# Patient Record
Sex: FEMALE | Race: WHITE | NOT HISPANIC OR LATINO | Employment: FULL TIME | URBAN - METROPOLITAN AREA
[De-identification: names, ages, dates, MRNs, and addresses within clinical notes are randomized per-mention and may not be internally consistent; named-entity substitution may affect disease eponyms.]

---

## 2023-02-21 LAB
CAROTENE: 121 UG/DL (ref 60–200)
CAROTENE: 131 UG/DL (ref 60–200)
VITAMIN B6: 76.7 NMOL/L (ref 20–125)
VITAMIN E (ALPHA-TOCOPHEROL): 9.3 MG/L (ref 5.5–18)
VITAMIN E (GAMMA-TOCOPHEROL): 1.5 MG/L (ref 0–6)

## 2023-02-22 LAB — VITAMIN B1, WHOLE BLOOD: 105 NMOL/L (ref 70–180)

## 2023-02-27 LAB — VITAMIN A (RETINOL): 60 UG/DL (ref 20.1–62)

## 2023-04-27 LAB
FERRITIN (UG/LL) IN SER/PLAS: 64 UG/L (ref 8–150)
IRON (UG/DL) IN SER/PLAS: 65 UG/DL (ref 35–150)
IRON BINDING CAPACITY (UG/DL) IN SER/PLAS: 320 UG/DL (ref 240–445)
IRON SATURATION (%) IN SER/PLAS: 20 % (ref 25–45)
THYROTROPIN (MIU/L) IN SER/PLAS BY DETECTION LIMIT <= 0.05 MIU/L: 1.5 MIU/L (ref 0.44–3.98)

## 2023-04-28 LAB
DEAMIDATED GLIADIN PEPTIDE IGA: 37 U/ML (ref 0–14)
DEAMIDATED GLIADIN PEPTIDE IGG: 84 U/ML (ref 0–14)
TISSUE TRANSGLUTAMINASE IGG: 19 U/ML (ref 0–14)
TISSUE TRANSGLUTAMINASE, IGA: 10 U/ML (ref 0–14)

## 2023-09-15 LAB
DEAMIDATED GLIADIN PEPTIDE IGA: 29 U/ML (ref 0–14)
DEAMIDATED GLIADIN PEPTIDE IGG: 79 U/ML (ref 0–14)
TISSUE TRANSGLUTAMINASE IGG: 10 U/ML (ref 0–14)
TISSUE TRANSGLUTAMINASE, IGA: 6 U/ML (ref 0–14)

## 2023-09-19 LAB
ALANINE AMINOTRANSFERASE (SGPT) (U/L) IN SER/PLAS: 19 U/L (ref 7–45)
ALBUMIN (G/DL) IN SER/PLAS: 4.1 G/DL (ref 3.4–5)
ALKALINE PHOSPHATASE (U/L) IN SER/PLAS: 55 U/L (ref 33–110)
ANION GAP IN SER/PLAS: 10 MMOL/L (ref 10–20)
APPEARANCE, URINE: CLEAR
ASPARTATE AMINOTRANSFERASE (SGOT) (U/L) IN SER/PLAS: 15 U/L (ref 9–39)
BASOPHILS (10*3/UL) IN BLOOD BY AUTOMATED COUNT: 0.04 X10E9/L (ref 0–0.1)
BASOPHILS/100 LEUKOCYTES IN BLOOD BY AUTOMATED COUNT: 0.7 % (ref 0–2)
BILIRUBIN TOTAL (MG/DL) IN SER/PLAS: 0.4 MG/DL (ref 0–1.2)
BILIRUBIN, URINE: NEGATIVE
BLOOD, URINE: NEGATIVE
CALCIUM (MG/DL) IN SER/PLAS: 10 MG/DL (ref 8.6–10.3)
CARBON DIOXIDE, TOTAL (MMOL/L) IN SER/PLAS: 28 MMOL/L (ref 21–32)
CHLORIDE (MMOL/L) IN SER/PLAS: 103 MMOL/L (ref 98–107)
CITRULLINE ANTIBODY, IGG: <1 U/ML
COLOR, URINE: NORMAL
COMPLEMENT C3 (MG/DL) IN SER/PLAS: 101 MG/DL (ref 87–200)
COMPLEMENT C4 (MG/DL) IN SER/PLAS: 18 MG/DL (ref 10–50)
CREATININE (MG/DL) IN SER/PLAS: 0.72 MG/DL (ref 0.5–1.05)
EOSINOPHILS (10*3/UL) IN BLOOD BY AUTOMATED COUNT: 0.07 X10E9/L (ref 0–0.7)
EOSINOPHILS/100 LEUKOCYTES IN BLOOD BY AUTOMATED COUNT: 1.2 % (ref 0–6)
ERYTHROCYTE DISTRIBUTION WIDTH (RATIO) BY AUTOMATED COUNT: 12.8 % (ref 11.5–14.5)
ERYTHROCYTE MEAN CORPUSCULAR HEMOGLOBIN CONCENTRATION (G/DL) BY AUTOMATED: 32.2 G/DL (ref 32–36)
ERYTHROCYTE MEAN CORPUSCULAR VOLUME (FL) BY AUTOMATED COUNT: 91 FL (ref 80–100)
ERYTHROCYTES (10*6/UL) IN BLOOD BY AUTOMATED COUNT: 4.7 X10E12/L (ref 4–5.2)
GFR FEMALE: >90 ML/MIN/1.73M2
GLUCOSE (MG/DL) IN SER/PLAS: 91 MG/DL (ref 74–99)
GLUCOSE, URINE: NEGATIVE MG/DL
HEMATOCRIT (%) IN BLOOD BY AUTOMATED COUNT: 42.9 % (ref 36–46)
HEMOGLOBIN (G/DL) IN BLOOD: 13.8 G/DL (ref 12–16)
IMMATURE GRANULOCYTES/100 LEUKOCYTES IN BLOOD BY AUTOMATED COUNT: 0.3 % (ref 0–0.9)
KETONES, URINE: NEGATIVE MG/DL
LEUKOCYTE ESTERASE, URINE: NEGATIVE
LEUKOCYTES (10*3/UL) IN BLOOD BY AUTOMATED COUNT: 6.1 X10E9/L (ref 4.4–11.3)
LYMPHOCYTES (10*3/UL) IN BLOOD BY AUTOMATED COUNT: 1.78 X10E9/L (ref 1.2–4.8)
LYMPHOCYTES/100 LEUKOCYTES IN BLOOD BY AUTOMATED COUNT: 29.3 % (ref 13–44)
MONOCYTES (10*3/UL) IN BLOOD BY AUTOMATED COUNT: 0.47 X10E9/L (ref 0.1–1)
MONOCYTES/100 LEUKOCYTES IN BLOOD BY AUTOMATED COUNT: 7.7 % (ref 2–10)
NEUTROPHILS (10*3/UL) IN BLOOD BY AUTOMATED COUNT: 3.7 X10E9/L (ref 1.2–7.7)
NEUTROPHILS/100 LEUKOCYTES IN BLOOD BY AUTOMATED COUNT: 60.8 % (ref 40–80)
NITRITE, URINE: NEGATIVE
PH, URINE: 7 (ref 5–8)
PLATELETS (10*3/UL) IN BLOOD AUTOMATED COUNT: 258 X10E9/L (ref 150–450)
POTASSIUM (MMOL/L) IN SER/PLAS: 3.8 MMOL/L (ref 3.5–5.3)
PROTEIN TOTAL: 7 G/DL (ref 6.4–8.2)
PROTEIN, URINE: NEGATIVE MG/DL
RHEUMATOID FACTOR (IU/ML) IN SERUM OR PLASMA: <10 IU/ML (ref 0–15)
SODIUM (MMOL/L) IN SER/PLAS: 137 MMOL/L (ref 136–145)
SPECIFIC GRAVITY, URINE: 1.01 (ref 1–1.03)
THYROTROPIN (MIU/L) IN SER/PLAS BY DETECTION LIMIT <= 0.05 MIU/L: 2.47 MIU/L (ref 0.44–3.98)
UREA NITROGEN (MG/DL) IN SER/PLAS: 16 MG/DL (ref 6–23)
UROBILINOGEN, URINE: <2 MG/DL (ref 0–1.9)

## 2023-09-20 LAB
ANA PATTERN: ABNORMAL
ANA TITER: ABNORMAL
ANTI-CENTROMERE: <0.2 AI
ANTI-CHROMATIN: 0.2 AI
ANTI-DNA (DS): 21 IU/ML
ANTI-JO-1 IGG: <0.2 AI
ANTI-NUCLEAR ANTIBODY (ANA): POSITIVE
ANTI-RIBOSOMAL P: <0.2 AI
ANTI-RNP: <0.2 AI
ANTI-SCL-70: <0.2 AI
ANTI-SM/RNP: <0.2 AI
ANTI-SM: <0.2 AI
ANTI-SSA: <0.2 AI
ANTI-SSB: <0.2 AI

## 2023-10-20 ENCOUNTER — PHARMACY VISIT (OUTPATIENT)
Dept: PHARMACY | Facility: CLINIC | Age: 48
End: 2023-10-20
Payer: COMMERCIAL

## 2023-10-20 PROCEDURE — RXMED WILLOW AMBULATORY MEDICATION CHARGE

## 2023-10-31 ENCOUNTER — PHARMACY VISIT (OUTPATIENT)
Dept: PHARMACY | Facility: CLINIC | Age: 48
End: 2023-10-31
Payer: COMMERCIAL

## 2023-11-01 ENCOUNTER — OFFICE VISIT (OUTPATIENT)
Dept: RHEUMATOLOGY | Facility: CLINIC | Age: 48
End: 2023-11-01
Payer: COMMERCIAL

## 2023-11-01 VITALS — SYSTOLIC BLOOD PRESSURE: 118 MMHG | BODY MASS INDEX: 25.24 KG/M2 | DIASTOLIC BLOOD PRESSURE: 70 MMHG | WEIGHT: 154 LBS

## 2023-11-01 DIAGNOSIS — M35.9 UNDIFFERENTIATED CONNECTIVE TISSUE DISEASE (MULTI): ICD-10-CM

## 2023-11-01 DIAGNOSIS — R76.8 POSITIVE ANA (ANTINUCLEAR ANTIBODY): Primary | ICD-10-CM

## 2023-11-01 DIAGNOSIS — M19.90 ARTHRITIS: ICD-10-CM

## 2023-11-01 PROBLEM — L81.4 OTHER MELANIN HYPERPIGMENTATION: Status: ACTIVE | Noted: 2021-12-02

## 2023-11-01 PROBLEM — D18.01 HEMANGIOMA OF SKIN AND SUBCUTANEOUS TISSUE: Status: ACTIVE | Noted: 2021-12-02

## 2023-11-01 PROBLEM — F41.9 ANXIETY: Status: ACTIVE | Noted: 2023-11-01

## 2023-11-01 PROBLEM — K90.0 CELIAC DISEASE (HHS-HCC): Status: ACTIVE | Noted: 2022-12-03

## 2023-11-01 PROBLEM — J30.9 ALLERGIC RHINITIS: Status: ACTIVE | Noted: 2023-11-01

## 2023-11-01 PROBLEM — D22.4 MELANOCYTIC NEVI OF SCALP AND NECK: Status: ACTIVE | Noted: 2021-12-02

## 2023-11-01 PROBLEM — F41.8 DEPRESSION WITH ANXIETY: Status: ACTIVE | Noted: 2022-12-03

## 2023-11-01 PROBLEM — R53.81 MALAISE: Status: ACTIVE | Noted: 2023-11-01

## 2023-11-01 PROBLEM — R87.612 LOW GRADE SQUAMOUS INTRAEPITH LESION ON CYTOLOGIC SMEAR CERVIX (LGSIL): Status: ACTIVE | Noted: 2023-11-01

## 2023-11-01 PROBLEM — C44.612 BASAL CELL CARCINOMA OF SKIN OF RIGHT UPPER LIMB, INCLUDING SHOULDER: Status: ACTIVE | Noted: 2021-12-01

## 2023-11-01 PROBLEM — R19.8 IRREGULAR BOWEL HABITS: Status: ACTIVE | Noted: 2023-11-01

## 2023-11-01 PROBLEM — R19.7 DIARRHEA: Status: ACTIVE | Noted: 2022-12-16

## 2023-11-01 PROBLEM — Z79.899 OTHER LONG TERM (CURRENT) DRUG THERAPY: Status: ACTIVE | Noted: 2022-12-03

## 2023-11-01 PROBLEM — R41.3 MEMORY LOSS: Status: ACTIVE | Noted: 2023-11-01

## 2023-11-01 PROBLEM — L91.8 OTHER HYPERTROPHIC DISORDERS OF THE SKIN: Status: ACTIVE | Noted: 2021-12-02

## 2023-11-01 PROBLEM — L70.9 ACNE: Status: ACTIVE | Noted: 2023-05-08

## 2023-11-01 PROBLEM — L81.9 ATYPICAL PIGMENTED SKIN LESION: Status: ACTIVE | Noted: 2023-11-01

## 2023-11-01 PROBLEM — R14.0 ABDOMINAL BLOATING: Status: ACTIVE | Noted: 2023-11-01

## 2023-11-01 PROBLEM — L30.9 DERMATITIS: Status: ACTIVE | Noted: 2023-11-01

## 2023-11-01 PROBLEM — R13.19 ESOPHAGEAL DYSPHAGIA: Status: ACTIVE | Noted: 2023-11-01

## 2023-11-01 PROBLEM — D22.70 MELANOCYTIC NEVI OF UNSPECIFIED LOWER LIMB, INCLUDING HIP: Status: ACTIVE | Noted: 2021-12-02

## 2023-11-01 PROBLEM — R19.8 OTHER SPECIFIED SYMPTOMS AND SIGNS INVOLVING THE DIGESTIVE SYSTEM AND ABDOMEN: Status: ACTIVE | Noted: 2023-06-30

## 2023-11-01 PROBLEM — K59.04 CHRONIC IDIOPATHIC CONSTIPATION: Status: ACTIVE | Noted: 2023-11-01

## 2023-11-01 PROBLEM — Z86.2 HISTORY OF ANEMIA: Status: ACTIVE | Noted: 2023-11-01

## 2023-11-01 PROBLEM — R23.2 HOT FLASHES: Status: ACTIVE | Noted: 2023-11-01

## 2023-11-01 PROBLEM — Z86.59 HISTORY OF DEPRESSION: Status: ACTIVE | Noted: 2023-11-01

## 2023-11-01 PROBLEM — R14.0 ABDOMINAL DISTENSION (GASEOUS): Status: ACTIVE | Noted: 2023-07-01

## 2023-11-01 PROBLEM — R19.4 CHANGE IN BOWEL HABIT: Status: ACTIVE | Noted: 2023-07-01

## 2023-11-01 PROBLEM — D22.5 MELANOCYTIC NEVI OF TRUNK: Status: ACTIVE | Noted: 2021-12-01

## 2023-11-01 PROBLEM — R11.0 MILD NAUSEA: Status: ACTIVE | Noted: 2023-11-01

## 2023-11-01 PROBLEM — G47.00 INSOMNIA: Status: ACTIVE | Noted: 2022-10-12

## 2023-11-01 PROBLEM — M25.50 ARTHRALGIA: Status: ACTIVE | Noted: 2023-11-01

## 2023-11-01 PROBLEM — R21 RASH: Status: ACTIVE | Noted: 2023-11-01

## 2023-11-01 PROBLEM — J30.1 ALLERGIC RHINITIS DUE TO POLLEN: Status: ACTIVE | Noted: 2023-11-01

## 2023-11-01 PROBLEM — F39 MOOD DISORDER (CMS-HCC): Status: ACTIVE | Noted: 2022-12-03

## 2023-11-01 PROBLEM — K59.09 CHRONIC CONSTIPATION: Status: ACTIVE | Noted: 2023-11-01

## 2023-11-01 PROBLEM — L70.0 ACNE VULGARIS: Status: ACTIVE | Noted: 2021-12-01

## 2023-11-01 PROBLEM — Z86.19 HISTORY OF VARICELLA: Status: ACTIVE | Noted: 2023-11-01

## 2023-11-01 PROBLEM — N63.10 BREAST MASS, RIGHT: Status: ACTIVE | Noted: 2023-11-01

## 2023-11-01 PROBLEM — R53.83 FATIGUE: Status: ACTIVE | Noted: 2022-10-12

## 2023-11-01 PROBLEM — B37.31 VAGINAL CANDIDIASIS: Status: ACTIVE | Noted: 2023-11-01

## 2023-11-01 PROCEDURE — 99214 OFFICE O/P EST MOD 30 MIN: CPT | Performed by: INTERNAL MEDICINE

## 2023-11-01 RX ORDER — NABUMETONE 750 MG/1
750 TABLET, FILM COATED ORAL 2 TIMES DAILY
Qty: 60 TABLET | Refills: 3 | Status: SHIPPED | OUTPATIENT
Start: 2023-11-01 | End: 2024-10-31

## 2023-11-01 RX ORDER — MULTIVIT,IRON,MINERALS/LUTEIN
TABLET ORAL
COMMUNITY
Start: 2022-10-06 | End: 2024-02-07 | Stop reason: ALTCHOICE

## 2023-11-01 RX ORDER — DAPSONE 50 MG/G
GEL TOPICAL
COMMUNITY
Start: 2021-12-02 | End: 2023-11-01 | Stop reason: ALTCHOICE

## 2023-11-01 RX ORDER — OXYCODONE AND ACETAMINOPHEN 5; 325 MG/1; MG/1
TABLET ORAL
COMMUNITY
Start: 2017-12-28 | End: 2023-11-01 | Stop reason: ALTCHOICE

## 2023-11-01 RX ORDER — LANOLIN ALCOHOL/MO/W.PET/CERES
CREAM (GRAM) TOPICAL
COMMUNITY
Start: 2023-09-19

## 2023-11-01 RX ORDER — BUPROPION HYDROCHLORIDE 150 MG/1
TABLET ORAL
COMMUNITY
End: 2023-11-01 | Stop reason: ALTCHOICE

## 2023-11-01 RX ORDER — BENZOYL PEROXIDE 100 MG/ML
LIQUID TOPICAL
COMMUNITY
Start: 2021-10-29 | End: 2023-11-01 | Stop reason: ALTCHOICE

## 2023-11-01 RX ORDER — DROSPIRENONE AND ETHINYL ESTRADIOL 0.03MG-3MG
KIT ORAL
COMMUNITY
Start: 2011-02-16 | End: 2023-11-01 | Stop reason: ALTCHOICE

## 2023-11-01 RX ORDER — CLOBETASOL PROPIONATE 0.5 MG/G
CREAM TOPICAL
COMMUNITY
Start: 2022-09-19 | End: 2023-11-01 | Stop reason: ALTCHOICE

## 2023-11-01 RX ORDER — TRIAMCINOLONE ACETONIDE 1 MG/G
OINTMENT TOPICAL
COMMUNITY
Start: 2021-09-20 | End: 2023-11-01 | Stop reason: ALTCHOICE

## 2023-11-01 RX ORDER — ISOTRETINOIN 40 MG/1
CAPSULE ORAL
COMMUNITY
Start: 2022-09-19 | End: 2023-11-01 | Stop reason: ALTCHOICE

## 2023-11-01 RX ORDER — CITALOPRAM 10 MG/1
TABLET ORAL
COMMUNITY
Start: 2022-12-15 | End: 2023-11-01 | Stop reason: ALTCHOICE

## 2023-11-01 RX ORDER — DESONIDE 0.5 MG/G
OINTMENT TOPICAL
COMMUNITY
Start: 2021-09-16 | End: 2023-11-01 | Stop reason: ALTCHOICE

## 2023-11-01 RX ORDER — CEFUROXIME AXETIL 250 MG/1
TABLET ORAL
COMMUNITY
Start: 2021-12-02 | End: 2023-11-01 | Stop reason: ALTCHOICE

## 2023-11-01 RX ORDER — SPIRONOLACTONE AND HYDROCHLOROTHIAZIDE 25; 25 MG/1; MG/1
TABLET ORAL
COMMUNITY
Start: 2022-11-29 | End: 2023-11-01 | Stop reason: ALTCHOICE

## 2023-11-01 RX ORDER — SPIRONOLACTONE 25 MG/1
TABLET ORAL
COMMUNITY
Start: 2022-12-15 | End: 2023-11-01 | Stop reason: ALTCHOICE

## 2023-11-01 NOTE — PROGRESS NOTES
Recheck  Arthralgia  /  +EVITA.  Improved pain with use of Nabumetone and Plaquinil.   Cont fatigue.       10 min late  HPI - Her pain is better with meds, but she continues to have fatigue.  She has not had a return call for her sleep study to schedule.  She sleeps wll most nights but can wake once or twice.  Sleeps 7 hrs.  No current pain.  Sl hand swelling on awakening.  AM stiffness 1 hr only in hands.  +CP continues - lasts up to 10 min.  No SOB.  No recent GI.  Had a rash on her abd last wk, now resolved.  No mouth sores.  +dry eyes    PE  NAD  RRR no r/m/g  CTA  No edema  No synovitis    A/P - (?) UCTD (+EVITA, low +dsDNA) with arthritis - improvement with hcq and nabumetone  Fatigue continues to be a problem.  She will stop at the  desk to see if they can help her schedule her sleep study  Reeval 3 mo or sooner PRN

## 2023-11-03 ENCOUNTER — PHARMACY VISIT (OUTPATIENT)
Dept: PHARMACY | Facility: CLINIC | Age: 48
End: 2023-11-03
Payer: COMMERCIAL

## 2023-11-03 PROCEDURE — RXMED WILLOW AMBULATORY MEDICATION CHARGE

## 2023-11-13 DIAGNOSIS — R53.83 OTHER FATIGUE: Primary | ICD-10-CM

## 2023-11-30 DIAGNOSIS — M35.9 UNDIFFERENTIATED CONNECTIVE TISSUE DISEASE (MULTI): Primary | ICD-10-CM

## 2023-12-01 ENCOUNTER — PHARMACY VISIT (OUTPATIENT)
Dept: PHARMACY | Facility: CLINIC | Age: 48
End: 2023-12-01
Payer: COMMERCIAL

## 2023-12-01 DIAGNOSIS — M35.9 UNDIFFERENTIATED CONNECTIVE TISSUE DISEASE (MULTI): ICD-10-CM

## 2023-12-01 PROCEDURE — RXMED WILLOW AMBULATORY MEDICATION CHARGE

## 2023-12-01 RX ORDER — HYDROXYCHLOROQUINE SULFATE 200 MG/1
200 TABLET, FILM COATED ORAL 2 TIMES DAILY
Qty: 180 TABLET | Refills: 0 | Status: SHIPPED | OUTPATIENT
Start: 2023-12-01 | End: 2024-11-30

## 2023-12-01 RX ORDER — HYDROXYCHLOROQUINE SULFATE 200 MG/1
200 TABLET, FILM COATED ORAL 2 TIMES DAILY
Qty: 180 TABLET | Refills: 0 | Status: SHIPPED | OUTPATIENT
Start: 2023-12-01 | End: 2023-12-01 | Stop reason: SDUPTHER

## 2023-12-01 RX ORDER — HYDROXYCHLOROQUINE SULFATE 200 MG/1
200 TABLET, FILM COATED ORAL 2 TIMES DAILY
Qty: 60 TABLET | Refills: 2 | Status: SHIPPED | OUTPATIENT
Start: 2023-12-01 | End: 2024-11-30

## 2023-12-14 ENCOUNTER — CLINICAL SUPPORT (OUTPATIENT)
Dept: SLEEP MEDICINE | Facility: HOSPITAL | Age: 48
End: 2023-12-14
Payer: COMMERCIAL

## 2023-12-14 DIAGNOSIS — R53.83 OTHER FATIGUE: ICD-10-CM

## 2023-12-14 PROCEDURE — 95806 SLEEP STUDY UNATT&RESP EFFT: CPT | Performed by: INTERNAL MEDICINE

## 2023-12-21 PROCEDURE — RXMED WILLOW AMBULATORY MEDICATION CHARGE

## 2023-12-22 PROCEDURE — RXMED WILLOW AMBULATORY MEDICATION CHARGE

## 2023-12-28 ENCOUNTER — PHARMACY VISIT (OUTPATIENT)
Dept: PHARMACY | Facility: CLINIC | Age: 48
End: 2023-12-28
Payer: COMMERCIAL

## 2023-12-28 DIAGNOSIS — J06.9 UPPER RESPIRATORY TRACT INFECTION, UNSPECIFIED TYPE: ICD-10-CM

## 2023-12-28 PROCEDURE — RXMED WILLOW AMBULATORY MEDICATION CHARGE

## 2023-12-28 RX ORDER — PREDNISONE 20 MG/1
TABLET ORAL
Qty: 10 TABLET | Refills: 1 | Status: SHIPPED | OUTPATIENT
Start: 2023-12-28

## 2023-12-28 RX ORDER — AZITHROMYCIN 250 MG/1
TABLET, FILM COATED ORAL
Qty: 6 TABLET | Refills: 1 | Status: SHIPPED | OUTPATIENT
Start: 2023-12-28

## 2024-02-07 ENCOUNTER — OFFICE VISIT (OUTPATIENT)
Dept: RHEUMATOLOGY | Facility: CLINIC | Age: 49
End: 2024-02-07
Payer: COMMERCIAL

## 2024-02-07 VITALS — SYSTOLIC BLOOD PRESSURE: 112 MMHG | WEIGHT: 154 LBS | BODY MASS INDEX: 25.24 KG/M2 | DIASTOLIC BLOOD PRESSURE: 60 MMHG

## 2024-02-07 DIAGNOSIS — R76.8 POSITIVE ANA (ANTINUCLEAR ANTIBODY): Primary | ICD-10-CM

## 2024-02-07 DIAGNOSIS — M19.90 ARTHRITIS: ICD-10-CM

## 2024-02-07 PROCEDURE — 99214 OFFICE O/P EST MOD 30 MIN: CPT | Performed by: INTERNAL MEDICINE

## 2024-02-07 RX ORDER — UBIDECARENONE 75 MG
500 CAPSULE ORAL DAILY
COMMUNITY

## 2024-02-07 NOTE — PROGRESS NOTES
Recheck  Arthralgia  /  + EVITA  Current use of vitamin b12 spray daily along with Vitamin D 5000IU daily.  Unable to add to med list.  Doing well with B12 injection along with infusion with zinc /  Bcomplex / magnesium / glutophione / vit c with great results.     HPI - doing well.  No pain/swelling.  Hands stiff in AM, but this has improved with meds.  No CP, resp, or GI.  Has a bump on the inside of her lip but no other mouth sores.  Eyes dry in AM - saline drops helps.  No rash, numbness/tingling, or raynauds.      PE   NAD   RRR no our/M/G   CTA   No edema   No synovitis     A/P -(?)UCTD with a positive EVITA and low positive double-stranded DNA and arthritis - doing well  Had nl home sleep study  Follow up in four months. If she remains stable, space visits to six months.

## 2024-02-20 PROCEDURE — RXMED WILLOW AMBULATORY MEDICATION CHARGE

## 2024-02-21 ENCOUNTER — PHARMACY VISIT (OUTPATIENT)
Dept: PHARMACY | Facility: CLINIC | Age: 49
End: 2024-02-21
Payer: COMMERCIAL

## 2024-02-26 ENCOUNTER — PHARMACY VISIT (OUTPATIENT)
Dept: PHARMACY | Facility: CLINIC | Age: 49
End: 2024-02-26
Payer: COMMERCIAL

## 2024-02-26 PROCEDURE — RXMED WILLOW AMBULATORY MEDICATION CHARGE

## 2024-03-07 ENCOUNTER — OFFICE VISIT (OUTPATIENT)
Dept: OBSTETRICS AND GYNECOLOGY | Facility: CLINIC | Age: 49
End: 2024-03-07
Payer: COMMERCIAL

## 2024-03-07 VITALS — SYSTOLIC BLOOD PRESSURE: 110 MMHG | DIASTOLIC BLOOD PRESSURE: 76 MMHG | BODY MASS INDEX: 25.4 KG/M2 | WEIGHT: 155 LBS

## 2024-03-07 DIAGNOSIS — Z01.419 ENCOUNTER FOR WELL WOMAN EXAM WITH ROUTINE GYNECOLOGICAL EXAM: Primary | ICD-10-CM

## 2024-03-07 DIAGNOSIS — Z12.31 ENCOUNTER FOR SCREENING MAMMOGRAM FOR BREAST CANCER: ICD-10-CM

## 2024-03-07 PROCEDURE — 1036F TOBACCO NON-USER: CPT | Performed by: OBSTETRICS & GYNECOLOGY

## 2024-03-07 PROCEDURE — 99396 PREV VISIT EST AGE 40-64: CPT | Performed by: OBSTETRICS & GYNECOLOGY

## 2024-03-07 NOTE — PROGRESS NOTES
"Pap: 2017  Mamm: 5/9/2022   Dexa: Never  Colon:    ASSESSMENT/PLAN  1. Encounter for well woman exam with routine gynecological exam  Routine well woman visit.   Your exam was normal today.   No pap was done.     2. Encounter for screening mammogram for breast cancer  - BI mammo bilateral screening tomosynthesis; Future     SUBJECTIVE    HPI    49 yo presents for routine well woman visit. Last visit 2022 c/o facial bumps, \"acne\" , It started as bumps near her nasal passage then became perioral. Was being treated for acne. Ultrimately discovered it was related to her celiac. As her numbers decreased, her bumps resolved.   Celiac now in control, feeling much better. Takes Trulance, a med for chronic constipation. Also on Plaquenil.   h/o total vaginal hysterectomy bilateral salpingectomy   CIN3 2017 colposcopy biopsy. 12/2017 path from hysterectomy CIN1 of cervix.   Denies any other intervening medical or surgical issues.  , nonsmoker, no ETOH.  Youngest 8 yo twins. 7 kids.    Review of Systems  Constitutional: no fever, no chills, + recent weight gain, no recent weight loss and no fatigue.   Eyes: no eye pain, no vision problems and no dryness of the eyes.   ENT: no hearing loss, no nosebleeds and no sinus congestion.   Cardiovascular: no chest pain, no palpitations and no orthopnea.   Respiratory: no shortness of breath, no cough and no wheezing.   Gastrointestinal: no abdominal pain, no constipation, no nausea, no diarrhea and no vomiting.   Genitourinary: no pelvic pain, no dysuria, no urinary incontinence, no vaginal dryness, no vaginal itching, no dyspareunia, no dysmenorrhea, no sexual problems, no change in urinary frequency, no vaginal discharge, no unexplained vaginal bleeding and no lesion/sore.   Musculoskeletal: no back pain, no joint swelling and no leg edema.   Integumentary: no rashes, no skin lesions, no breast pain, no nipple discharge and no breast lump.   Neurological: no headache, no " numbness and no dizziness.   Psychiatric: + sleep disturbances, no anxiety and no depression.   Endocrine: no hot flashes, no loss of hair and no hirsutism.   Hematologic/Lymphatic: no swollen glands, no tendency for easy bleeding and no tendency for easy bruising.      OBJECTIVE    /76   Wt 70.3 kg (155 lb)   BMI 25.40 kg/m²     Physical Exam     Constitutional: Alert and in no acute distress. Well developed, well nourished   Head and Face: Head and face: normal   Eyes: Normal external exam - nonicteric sclera, extraocular movements intact (EOMI) and no ptosis.   Ears, Nose, Mouth, and Throat: External inspection of ears and nose: normal   Neck: no neck asymmetry. Supple and thyroid not enlarged and there were no palpable thyroid nodules   Cardiovascular: Heart rate and rhythm were normal, normal S1 and S2, no gallops, and no murmurs   Pulmonary: No respiratory distress and clear bilateral breath sounds   Chest: Breasts: normal appearance, no nipple discharge and no skin changes and palpation of breasts and axillae: no palpable mass and no axillary lymphadenopathy   Abdomen: soft nontender; no abdominal mass palpated, no organomegaly and no hernias   Genitourinary: external genitalia: normal, no inguinal lymphadenopathy, Bartholin's urethral and [place]'s glands: normal, urethra: normal, bladder: normal on palpation and perianal area: normal   Vagina: normal appearing vaginal cuff.  Cervix: absent  Uterus: absent.  Right Adnexa/parametria: Normal.   Left Adnexa/parametria: Normal.   Skin: normal skin color and pigmentation, normal skin turgor, and no rash.   Psychiatric: alert and oriented x 3., affect normal to patient baseline and mood: appropriate          Michelle Puckett MD

## 2024-03-13 ENCOUNTER — HOSPITAL ENCOUNTER (OUTPATIENT)
Dept: RADIOLOGY | Facility: HOSPITAL | Age: 49
Discharge: HOME | End: 2024-03-13
Payer: COMMERCIAL

## 2024-03-13 VITALS — BODY MASS INDEX: 14.73 KG/M2 | HEIGHT: 72 IN

## 2024-03-13 DIAGNOSIS — Z12.31 ENCOUNTER FOR SCREENING MAMMOGRAM FOR BREAST CANCER: ICD-10-CM

## 2024-03-13 PROCEDURE — 77063 BREAST TOMOSYNTHESIS BI: CPT | Performed by: RADIOLOGY

## 2024-03-13 PROCEDURE — 77067 SCR MAMMO BI INCL CAD: CPT

## 2024-03-13 PROCEDURE — 77067 SCR MAMMO BI INCL CAD: CPT | Performed by: RADIOLOGY

## 2024-03-14 ENCOUNTER — APPOINTMENT (OUTPATIENT)
Dept: OBSTETRICS AND GYNECOLOGY | Facility: CLINIC | Age: 49
End: 2024-03-14
Payer: COMMERCIAL

## 2024-03-19 ENCOUNTER — OFFICE VISIT (OUTPATIENT)
Dept: PRIMARY CARE | Facility: CLINIC | Age: 49
End: 2024-03-19
Payer: COMMERCIAL

## 2024-03-19 ENCOUNTER — LAB (OUTPATIENT)
Dept: LAB | Facility: LAB | Age: 49
End: 2024-03-19
Payer: COMMERCIAL

## 2024-03-19 VITALS
HEART RATE: 67 BPM | SYSTOLIC BLOOD PRESSURE: 120 MMHG | OXYGEN SATURATION: 97 % | HEIGHT: 67 IN | DIASTOLIC BLOOD PRESSURE: 82 MMHG | WEIGHT: 153 LBS | BODY MASS INDEX: 24.01 KG/M2

## 2024-03-19 DIAGNOSIS — R53.82 CHRONIC FATIGUE: Primary | ICD-10-CM

## 2024-03-19 DIAGNOSIS — R53.82 CHRONIC FATIGUE: ICD-10-CM

## 2024-03-19 LAB
BASOPHILS # BLD AUTO: 0.07 X10*3/UL (ref 0–0.1)
BASOPHILS NFR BLD AUTO: 1 %
EOSINOPHIL # BLD AUTO: 0.17 X10*3/UL (ref 0–0.7)
EOSINOPHIL NFR BLD AUTO: 2.4 %
ERYTHROCYTE [DISTWIDTH] IN BLOOD BY AUTOMATED COUNT: 12.4 % (ref 11.5–14.5)
HCT VFR BLD AUTO: 42.9 % (ref 36–46)
HGB BLD-MCNC: 14.3 G/DL (ref 12–16)
IMM GRANULOCYTES # BLD AUTO: 0.02 X10*3/UL (ref 0–0.7)
IMM GRANULOCYTES NFR BLD AUTO: 0.3 % (ref 0–0.9)
LYMPHOCYTES # BLD AUTO: 2.16 X10*3/UL (ref 1.2–4.8)
LYMPHOCYTES NFR BLD AUTO: 30.3 %
MCH RBC QN AUTO: 30.2 PG (ref 26–34)
MCHC RBC AUTO-ENTMCNC: 33.3 G/DL (ref 32–36)
MCV RBC AUTO: 91 FL (ref 80–100)
MONOCYTES # BLD AUTO: 0.59 X10*3/UL (ref 0.1–1)
MONOCYTES NFR BLD AUTO: 8.3 %
NEUTROPHILS # BLD AUTO: 4.11 X10*3/UL (ref 1.2–7.7)
NEUTROPHILS NFR BLD AUTO: 57.7 %
NRBC BLD-RTO: 0 /100 WBCS (ref 0–0)
PLATELET # BLD AUTO: 261 X10*3/UL (ref 150–450)
RBC # BLD AUTO: 4.74 X10*6/UL (ref 4–5.2)
TSH SERPL-ACNC: 1.68 MIU/L (ref 0.44–3.98)
WBC # BLD AUTO: 7.1 X10*3/UL (ref 4.4–11.3)

## 2024-03-19 PROCEDURE — 82607 VITAMIN B-12: CPT

## 2024-03-19 PROCEDURE — 82306 VITAMIN D 25 HYDROXY: CPT

## 2024-03-19 PROCEDURE — 84481 FREE ASSAY (FT-3): CPT

## 2024-03-19 PROCEDURE — 1036F TOBACCO NON-USER: CPT | Performed by: NURSE PRACTITIONER

## 2024-03-19 PROCEDURE — 84443 ASSAY THYROID STIM HORMONE: CPT

## 2024-03-19 PROCEDURE — 36415 COLL VENOUS BLD VENIPUNCTURE: CPT

## 2024-03-19 PROCEDURE — 85025 COMPLETE CBC W/AUTO DIFF WBC: CPT

## 2024-03-19 PROCEDURE — 99213 OFFICE O/P EST LOW 20 MIN: CPT | Performed by: NURSE PRACTITIONER

## 2024-03-19 NOTE — PROGRESS NOTES
"Subjective   Chief Complaint   Patient presents with    Follow-up     Pt came in to talked about getting lab work        Patient ID: Shonna Lambert is a 48 y.o. female who presents for Follow-up (Pt came in to talked about getting lab work ).    HPI  Shonna is an est 49 yo female presenting today for   LOV Dec 2022    Dx: Celiac Ds, Constipation     Pt states she f/u with GI last year and was dx'd with SIBO and tx and did feel much better, but still has fatigue and would like me to check some vitamin levels  Pt states she is currently getting Vitamin D and B12 infusions at [facility] per COMFORT Doe and would like her levels checked     Allergies   Allergen Reactions    Ciprofloxacin Angioedema, Swelling and Hives    Gluten Unknown    Quinolones Unknown    Soybean Unknown       Review of Systems  ROS was completed and all systems are negative with the exception of what was noted in the the HPI.       Objective   /82   Pulse 67   Ht 1.702 m (5' 7\")   Wt 69.4 kg (153 lb)   SpO2 97%   BMI 23.96 kg/m²      Current Outpatient Medications   Medication Instructions    azithromycin (Zithromax) 250 mg tablet Take 2 tablets by mouth first day,then take 1 tablet by mouth daily for 4 days    calcium citrate-vitamin D2 250 mg-2.5 mcg (100 unit) tablet 1 tablet, oral, 2 times daily    cyanocobalamin (VITAMIN B-12) 500 mcg, oral, Daily    docusate sodium (COLACE ORAL) Colace CAPS TAKE 1 CAPSULE Daily Quantity: 0 Refills: 0 Ordered: 7-Sep-2023 DO Active    hydroxychloroquine (Plaquenil) 200 mg tablet TAKE 1 TABLET BY MOUTH TWO TIMES A DAY    hydroxychloroquine (Plaquenil) 200 mg tablet TAKE 1 TABLET BY MOUTH TWO TIMES A DAY    Lactobacillus acidophilus (PROBIOTIC ORAL) Probiotic CAPS TAKE 1 CAPSULE Daily Quantity: 0 Refills: 0 Ordered: 29-Jun-2023 DO Active    magnesium oxide (Mag-Ox) 400 mg (241.3 mg magnesium) tablet Magnesium Oxide -Mg Supplement 400 (240 Mg) MG Oral Tablet take 1 tablet by mouth once daily Quantity: 0 " Refills: 0 Ordered: 19-Sep-2023 Harmony Whaley MD Start : 19-Sep-2023 Active    nabumetone (RELAFEN) 750 mg, oral, 2 times daily    plecanatide (Trulance) tablet tablet TAKE 1 TABLET BY MOUTH ONCE DAILY    predniSONE (Deltasone) 20 mg tablet Take 1 tablet by mouth twice a day for 5 days         Physical Exam  Vitals reviewed.   Cardiovascular:      Pulses: Normal pulses.      Heart sounds: Normal heart sounds.   Pulmonary:      Effort: Pulmonary effort is normal.      Breath sounds: Normal breath sounds.   Psychiatric:         Mood and Affect: Mood normal.         Assessment/Plan   Problem List Items Addressed This Visit             ICD-10-CM    Fatigue - Primary R53.83    Relevant Orders    Vitamin D 25-Hydroxy,Total (for eval of Vitamin D levels)    Vitamin B12    TSH with reflex to Free T4 if abnormal    CBC and Auto Differential    T3, free

## 2024-03-19 NOTE — PATIENT INSTRUCTIONS
Thank you for seeing me today.  It was a pleasure to see you again!    #FATIGUE  Continue with infusions per [facility]  Labs ordered    RTC AS NEEDED

## 2024-03-20 LAB
25(OH)D3 SERPL-MCNC: 46 NG/ML (ref 30–100)
T3FREE SERPL-MCNC: 3.2 PG/ML (ref 2.3–4.2)
VIT B12 SERPL-MCNC: 786 PG/ML (ref 211–911)

## 2024-04-03 PROCEDURE — RXMED WILLOW AMBULATORY MEDICATION CHARGE

## 2024-04-15 ENCOUNTER — PHARMACY VISIT (OUTPATIENT)
Dept: PHARMACY | Facility: CLINIC | Age: 49
End: 2024-04-15
Payer: COMMERCIAL

## 2024-05-03 ENCOUNTER — TELEPHONE (OUTPATIENT)
Dept: GASTROENTEROLOGY | Facility: CLINIC | Age: 49
End: 2024-05-03
Payer: COMMERCIAL

## 2024-05-03 NOTE — TELEPHONE ENCOUNTER
Called pt to follow up, she said she spoke with someone yesterday and she was told to take magnesium citrate over the counter, because the trulance was to expensive.  I told her if she needs anything else to let our office know.       ----- Message from Do Rivera MA sent at 5/3/2024 11:00 AM EDT -----  Regarding: KEVYN Harris from [facility] l/m regarding the above Dr. Moran patient stating  her insurance no longer covers Trulance and it's very expensive. Please call Kimberly back at [phone] or send a script for an alternative.     Thank You

## 2024-05-13 PROCEDURE — RXMED WILLOW AMBULATORY MEDICATION CHARGE

## 2024-05-20 ENCOUNTER — PHARMACY VISIT (OUTPATIENT)
Dept: PHARMACY | Facility: CLINIC | Age: 49
End: 2024-05-20
Payer: COMMERCIAL

## 2024-06-11 ENCOUNTER — APPOINTMENT (OUTPATIENT)
Dept: DERMATOLOGY | Facility: CLINIC | Age: 49
End: 2024-06-11
Payer: COMMERCIAL

## 2024-06-12 ENCOUNTER — OFFICE VISIT (OUTPATIENT)
Dept: RHEUMATOLOGY | Facility: CLINIC | Age: 49
End: 2024-06-12
Payer: COMMERCIAL

## 2024-06-12 VITALS — SYSTOLIC BLOOD PRESSURE: 118 MMHG | BODY MASS INDEX: 24.28 KG/M2 | WEIGHT: 155 LBS | DIASTOLIC BLOOD PRESSURE: 68 MMHG

## 2024-06-12 DIAGNOSIS — M35.9 UNDIFFERENTIATED CONNECTIVE TISSUE DISEASE (MULTI): Primary | ICD-10-CM

## 2024-06-12 DIAGNOSIS — M19.90 ARTHRITIS: ICD-10-CM

## 2024-06-12 DIAGNOSIS — R76.8 POSITIVE ANA (ANTINUCLEAR ANTIBODY): ICD-10-CM

## 2024-06-12 PROCEDURE — 99214 OFFICE O/P EST MOD 30 MIN: CPT | Performed by: INTERNAL MEDICINE

## 2024-06-12 PROCEDURE — RXMED WILLOW AMBULATORY MEDICATION CHARGE

## 2024-06-12 RX ORDER — HYDROXYCHLOROQUINE SULFATE 200 MG/1
200 TABLET, FILM COATED ORAL 2 TIMES DAILY
Qty: 180 TABLET | Refills: 1 | Status: SHIPPED | OUTPATIENT
Start: 2024-06-12 | End: 2025-06-12

## 2024-06-12 NOTE — PROGRESS NOTES
Recheck  Arthralgia  /  + EVITA    Have not needed nabumetone.  No pain since starting hydroxychloroquine.  Cont fatigue.    HPI - she has a lot of fatigue.  She is sleeping well.  No pain/swelling.  No AM stiffness.  Her eyes dry in AM - drops help.  No CP, resp, or GI.  She has a rash on her nose - she has seen derm who recommended accutane or spironolactone, but she didn't take, and it resolved until recently.  She is to see derm next mo.   No mouth sores.  Sl dry mouth.  Sometimes gets a scabbed area in nostril.  No numbness/tingling    PE  NAD  RRR no r/m/g  CTA  No edema  No synovitis  NT and no pain with ROM throughout  Bump on R nostril with makeup over it    A/P - arthritis and UCTD and low +EVITA/dsDNA with celiac dz.  Joints doing well with hcq.  Fatigue continues - unfortunately this is a tough symptom to treat  Has appt with derm   Reviewed prev labs  Will check labs at next OV  Reeval 6 mo or sooner PRN

## 2024-07-01 ENCOUNTER — APPOINTMENT (OUTPATIENT)
Dept: DERMATOLOGY | Facility: CLINIC | Age: 49
End: 2024-07-01
Payer: COMMERCIAL

## 2024-07-01 RX ORDER — PLECANATIDE 3 MG/1
3 TABLET ORAL DAILY
COMMUNITY

## 2024-07-03 ENCOUNTER — PHARMACY VISIT (OUTPATIENT)
Dept: PHARMACY | Facility: CLINIC | Age: 49
End: 2024-07-03
Payer: COMMERCIAL

## 2024-09-18 ENCOUNTER — OFFICE VISIT (OUTPATIENT)
Dept: PRIMARY CARE | Facility: CLINIC | Age: 49
End: 2024-09-18
Payer: COMMERCIAL

## 2024-09-18 ENCOUNTER — LAB (OUTPATIENT)
Dept: LAB | Facility: LAB | Age: 49
End: 2024-09-18
Payer: COMMERCIAL

## 2024-09-18 VITALS
SYSTOLIC BLOOD PRESSURE: 124 MMHG | OXYGEN SATURATION: 95 % | WEIGHT: 149 LBS | BODY MASS INDEX: 23.39 KG/M2 | HEIGHT: 67 IN | HEART RATE: 67 BPM | DIASTOLIC BLOOD PRESSURE: 83 MMHG

## 2024-09-18 DIAGNOSIS — R53.83 OTHER FATIGUE: Primary | ICD-10-CM

## 2024-09-18 DIAGNOSIS — R53.83 OTHER FATIGUE: ICD-10-CM

## 2024-09-18 LAB
MAGNESIUM SERPL-MCNC: 1.88 MG/DL (ref 1.6–2.4)
TSH SERPL-ACNC: 2.38 MIU/L (ref 0.44–3.98)

## 2024-09-18 PROCEDURE — 83002 ASSAY OF GONADOTROPIN (LH): CPT

## 2024-09-18 PROCEDURE — 3008F BODY MASS INDEX DOCD: CPT | Performed by: NURSE PRACTITIONER

## 2024-09-18 PROCEDURE — 84481 FREE ASSAY (FT-3): CPT

## 2024-09-18 PROCEDURE — 82607 VITAMIN B-12: CPT

## 2024-09-18 PROCEDURE — 36415 COLL VENOUS BLD VENIPUNCTURE: CPT

## 2024-09-18 PROCEDURE — 83735 ASSAY OF MAGNESIUM: CPT

## 2024-09-18 PROCEDURE — 99213 OFFICE O/P EST LOW 20 MIN: CPT | Performed by: NURSE PRACTITIONER

## 2024-09-18 PROCEDURE — 82306 VITAMIN D 25 HYDROXY: CPT

## 2024-09-18 PROCEDURE — 84443 ASSAY THYROID STIM HORMONE: CPT

## 2024-09-18 PROCEDURE — 1036F TOBACCO NON-USER: CPT | Performed by: NURSE PRACTITIONER

## 2024-09-18 PROCEDURE — 83001 ASSAY OF GONADOTROPIN (FSH): CPT

## 2024-09-18 NOTE — ASSESSMENT & PLAN NOTE
Orders:    Vitamin B12; Future    Vitamin D 25-Hydroxy,Total (for eval of Vitamin D levels); Future    Magnesium; Future    TSH with reflex to Free T4 if abnormal; Future    T3, free; Future    FSH & LH; Future

## 2024-09-18 NOTE — PATIENT INSTRUCTIONS
Thank you for seeing me today Shonna Lambert, it was a pleasure to see you again!    Lets check some labs and see if anything is going on     Age of Menopause is usually between 45-55, with the average age being 51.  Hollie-menopause can begin around age 35 and continue for upwards of 20 years  Symptoms include: elevated cholesterol, brain fog, frozen shoulder, vertigo, tinnitus, dry skin, headaches, increased belly fat, arthritic pain, fatigue and sleep disturbances.     For assistance with scheduling referrals or consultations, please call [phone] or [phone].    For laboratory, radiology, and other tests, please call [phone] ([phone] for pediatrics).   If you do not get results within 7-10 days, or you have any questions or concerns, please send a message, call the office ([phone]), or return to the office for a follow-up appointment.     For acute/sick visits, if you are unable to get an office visit, you can do a  On Demand Virtual Visit that is accessible via your My Chart account.  For emergencies, call 9-1-1 or go to the nearest Emergency Department.     Please schedule additional appointment(s) to address concern(s) not addressed today.    Please review prescription inserts and published information for possible adverse effects of all medications.     In general, results are discussed over the phone or via  [employer].     You can see your health information, review clinical summaries from office visits & test results online when you follow your health with MY  Chart, a personal health record.   To sign up go to [email or web address].   If you need assistance with signing up or trouble getting into your account call [employer] Patient Line 24/7 at [phone]     [facility] AS NEEDED     Take Care,   Christina Olivia

## 2024-09-18 NOTE — PROGRESS NOTES
"Shonna Lambert is a 49 y.o. female who presents today for c/o fatigue     Chief Complaint   Patient presents with    Fatigue     Shonna Lambert is a 49 y.o. female who presents today for c/o fatigue. Patient states this has gotten bad again in the last 3 months.       Symptoms: Chronic Fatigue  Pt has been feeling fatigued for @ 2-3 months  Followed by Rheum, Dr. Whaley, LOV June 2024--> pt c/o fatigue at that time, but no orders     Pt is seeing COMFORT Doe for wellness and getting monthly injections of B12, D3, B6    She is also taking 5000 Vitamin D3 and B12 daily at home   She started IV treatments in Nov 2023  She states she noticed an improvement in her fatigue, but then @ 2-3 months ago her fatigued worsened  Pt also endorses palpitations and \"feeling hot poker in the middle of her chest and under left breast\"  She can not relate this to any specific precipitating event     Pt is not exercising on her own daily, she does the following for her work:   Bikes 10 miles once per week   Walks 3 miles once per week   Boxing once/week with work     Allergies   Allergen Reactions    Ciprofloxacin Angioedema, Swelling and Hives    Gluten Unknown    Quinolones Unknown    Soybean Unknown       Review of Systems  ROS was completed and all systems are negative with the exception of what was noted in the the HPI.       Objective   Vitals:  /83   Pulse 67   Ht 1.702 m (5' 7\")   Wt 67.6 kg (149 lb)   SpO2 95%   BMI 23.34 kg/m²       Current Outpatient Medications   Medication Instructions    cyanocobalamin, vitamin B-12, (B-12 COMPLIANCE INJ) injection, Every 30 days    docusate sodium (COLACE ORAL) Colace CAPS TAKE 1 CAPSULE Daily Quantity: 0 Refills: 0 Ordered: 7-Sep-2023 DO Active    hydroxychloroquine (Plaquenil) 200 mg tablet TAKE 1 TABLET BY MOUTH TWO TIMES A DAY    magnesium oxide (Mag-Ox) 400 mg (241.3 mg magnesium) tablet Magnesium Oxide -Mg Supplement 400 (240 Mg) MG Oral Tablet take 1 tablet by mouth once " daily Quantity: 0 Refills: 0 Ordered: 19-Sep-2023 Harmony Whaley MD Start : 19-Sep-2023 Active    nabumetone (RELAFEN) 750 mg, oral, 2 times daily    Trulance 3 mg, oral, Daily         Physical Exam  Vitals reviewed.   Cardiovascular:      Pulses: Normal pulses.      Heart sounds: Normal heart sounds.   Pulmonary:      Effort: Pulmonary effort is normal.      Breath sounds: Normal breath sounds.   Neurological:      Mental Status: She is alert and oriented to person, place, and time.   Psychiatric:      Comments: Flat affect          Assessment & Plan  Other fatigue    Orders:    Vitamin B12; Future    Vitamin D 25-Hydroxy,Total (for eval of Vitamin D levels); Future    Magnesium; Future    TSH with reflex to Free T4 if abnormal; Future    T3, free; Future    FSH & LH; Future

## 2024-09-19 LAB
25(OH)D3 SERPL-MCNC: 59 NG/ML (ref 30–100)
FSH SERPL-ACNC: 3.1 IU/L
LH SERPL-ACNC: 3.4 IU/L
T3FREE SERPL-MCNC: 2.8 PG/ML (ref 2.3–4.2)
VIT B12 SERPL-MCNC: >2000 PG/ML (ref 211–911)

## 2024-09-27 ENCOUNTER — APPOINTMENT (OUTPATIENT)
Dept: PRIMARY CARE | Facility: CLINIC | Age: 49
End: 2024-09-27
Payer: COMMERCIAL

## 2024-10-21 ENCOUNTER — APPOINTMENT (OUTPATIENT)
Dept: GASTROENTEROLOGY | Facility: CLINIC | Age: 49
End: 2024-10-21
Payer: COMMERCIAL

## 2024-10-21 VITALS — HEIGHT: 66 IN | BODY MASS INDEX: 25.07 KG/M2 | HEART RATE: 73 BPM | WEIGHT: 156 LBS

## 2024-10-21 DIAGNOSIS — K59.04 CHRONIC IDIOPATHIC CONSTIPATION: ICD-10-CM

## 2024-10-21 DIAGNOSIS — R11.0 MILD NAUSEA: Primary | ICD-10-CM

## 2024-10-21 DIAGNOSIS — R14.0 ABDOMINAL BLOATING: ICD-10-CM

## 2024-10-21 PROCEDURE — 1036F TOBACCO NON-USER: CPT | Performed by: INTERNAL MEDICINE

## 2024-10-21 PROCEDURE — RXMED WILLOW AMBULATORY MEDICATION CHARGE

## 2024-10-21 PROCEDURE — 3008F BODY MASS INDEX DOCD: CPT | Performed by: INTERNAL MEDICINE

## 2024-10-21 PROCEDURE — 99214 OFFICE O/P EST MOD 30 MIN: CPT | Performed by: INTERNAL MEDICINE

## 2024-10-21 RX ORDER — CYANOCOBALAMIN (VITAMIN B-12) 1000MCG/ML
1 DROPS ORAL DAILY
COMMUNITY

## 2024-10-21 RX ORDER — BUTYROSPERMUM PARKII(SHEA BUTTER), SIMMONDSIA CHINENSIS (JOJOBA) SEED OIL, ALOE BARBADENSIS LEAF EXTRACT .01; 1; 3.5 G/100G; G/100G; G/100G
1 LIQUID TOPICAL DAILY
COMMUNITY

## 2024-10-21 RX ORDER — ACETAMINOPHEN 500 MG
5000 TABLET ORAL DAILY
COMMUNITY

## 2024-10-21 RX ORDER — ONDANSETRON 4 MG/1
4 TABLET, ORALLY DISINTEGRATING ORAL EVERY 8 HOURS PRN
Qty: 30 TABLET | Refills: 2 | Status: SHIPPED | OUTPATIENT
Start: 2024-10-21 | End: 2024-12-20

## 2024-10-21 RX ORDER — PLECANATIDE 3 MG/1
3 TABLET ORAL DAILY
Qty: 90 TABLET | Refills: 3 | Status: SHIPPED | OUTPATIENT
Start: 2024-10-21 | End: 2025-10-21

## 2024-10-21 RX ORDER — SODIUM, POTASSIUM,MAG SULFATES 17.5-3.13G
SOLUTION, RECONSTITUTED, ORAL ORAL
Qty: 354 ML | Refills: 0 | Status: SHIPPED | OUTPATIENT
Start: 2024-10-21

## 2024-10-21 NOTE — PROGRESS NOTES
Subjective     History of Present Illness:   50 yo with anxiety/depression, h/o constipation, celiac disease seen in follow up for abdominal bloating, discomfort, fatigue. Previously open access EGD/colonoscopy for elevated celiac serologies performed in jan 2022 with small bowel biopsies confirming celiac disease. Colonoscopy was normal to TI with internal hemorrhoids.     Last seen 1 yr ago after being treated empirically with Augmentin after long course of doxycycline or likely sibo and started on trulance for constipation with improvement in symptoms.  trulance and colace with daily complete evacuation and no abd pain or bloating. Reported significant fatigue.    Mag ox, colace , miralax once day Bm tries to have daily but needs to use suppository. Still very constipated.  Beginning of year trulance cost 1500 . Has been off since that time.   + bloating, ++ nausea using zofran.  No blood per rectum or weight loss.  Inadvertent ingestion of gluten 5 weeks ago got very ill, vomiting.   No blood per rectum  Fatigue still significant, will be seeing another rheumatologist soon          Previous workup:  Abd ultrasound negative. Stool studies wnl, Fcal neg, fecal fat neg.   KUB with stool buildup- fiber led to bloating , no change with miralax x 3 days. Hydrogen breath test negative forSIBO.   Celiac panel - TTG IGA 10, gliaden IgG 84 , DGP igA 37, TTG IGG 19- all improved from prior.              Allergies  Allergies   Allergen Reactions    Ciprofloxacin Angioedema, Swelling and Hives    Gluten Unknown    Quinolones Unknown    Soybean Unknown       Medications  Current Outpatient Medications   Medication Instructions    cyanocobalamin, vitamin B-12, (B-12 COMPLIANCE INJ) injection, Every 30 days    docusate sodium (COLACE ORAL) Colace CAPS TAKE 1 CAPSULE Daily Quantity: 0 Refills: 0 Ordered: 7-Sep-2023 DO Active    hydroxychloroquine (Plaquenil) 200 mg tablet TAKE 1 TABLET BY MOUTH TWO TIMES A DAY    magnesium oxide  (Mag-Ox) 400 mg (241.3 mg magnesium) tablet Magnesium Oxide -Mg Supplement 400 (240 Mg) MG Oral Tablet take 1 tablet by mouth once daily Quantity: 0 Refills: 0 Ordered: 19-Sep-2023 Harmony Whaley MD Start : 19-Sep-2023 Active    nabumetone (RELAFEN) 750 mg, oral, 2 times daily    Trulance 3 mg, oral, Daily        Objective   There were no vitals taken for this visit.   Physical Exam  Cardiovascular:      Rate and Rhythm: Normal rate and regular rhythm.   Pulmonary:      Effort: Pulmonary effort is normal. No respiratory distress.      Breath sounds: Normal breath sounds. No wheezing.   Abdominal:      General: Bowel sounds are normal. There is no distension.      Palpations: Abdomen is soft. There is no mass.      Tenderness: There is no abdominal tenderness.   Neurological:      Mental Status: She is alert.               Assessment/Plan:    50 yo with anxiety/depression, h/o constipation, celiac disease seen in f/u for abdominal bloating and fatigue, chronic constipation. Has been off trulance with recurrent constipation not well managed on otc mag oxide, miralax , colace and suppositories. Previously good response on trulance with daily BM.  Also reporting nausea and bloating. Possibly due to large fecal load/constipation , may also have recurrent SIBO. Recommend treating constipation and if persistent sx will repeat tx for SIBO.     Suprep for clean out 1-2 bottles  Add senna or bisacodyl until daily to miralax until able to get trulance  Trulance ordered  Call with results   Follow up in 3 mo      Brii Moran MD

## 2024-10-22 ENCOUNTER — PHARMACY VISIT (OUTPATIENT)
Dept: PHARMACY | Facility: CLINIC | Age: 49
End: 2024-10-22
Payer: COMMERCIAL

## 2024-10-22 PROCEDURE — RXMED WILLOW AMBULATORY MEDICATION CHARGE

## 2024-10-23 ENCOUNTER — PHARMACY VISIT (OUTPATIENT)
Dept: PHARMACY | Facility: CLINIC | Age: 49
End: 2024-10-23
Payer: COMMERCIAL

## 2024-12-02 ENCOUNTER — APPOINTMENT (OUTPATIENT)
Dept: DERMATOLOGY | Facility: CLINIC | Age: 49
End: 2024-12-02
Payer: COMMERCIAL

## 2024-12-04 ENCOUNTER — OFFICE VISIT (OUTPATIENT)
Dept: RHEUMATOLOGY | Facility: CLINIC | Age: 49
End: 2024-12-04
Payer: COMMERCIAL

## 2024-12-04 ENCOUNTER — APPOINTMENT (OUTPATIENT)
Dept: RHEUMATOLOGY | Facility: CLINIC | Age: 49
End: 2024-12-04
Payer: COMMERCIAL

## 2024-12-04 ENCOUNTER — LAB (OUTPATIENT)
Dept: LAB | Facility: LAB | Age: 49
End: 2024-12-04
Payer: COMMERCIAL

## 2024-12-04 VITALS — WEIGHT: 160 LBS | BODY MASS INDEX: 26.02 KG/M2 | DIASTOLIC BLOOD PRESSURE: 56 MMHG | SYSTOLIC BLOOD PRESSURE: 92 MMHG

## 2024-12-04 DIAGNOSIS — M35.9 UNDIFFERENTIATED CONNECTIVE TISSUE DISEASE (MULTI): Primary | ICD-10-CM

## 2024-12-04 DIAGNOSIS — M35.9 UNDIFFERENTIATED CONNECTIVE TISSUE DISEASE (MULTI): ICD-10-CM

## 2024-12-04 DIAGNOSIS — M19.90 ARTHRITIS: ICD-10-CM

## 2024-12-04 LAB
ALBUMIN SERPL BCP-MCNC: 4.3 G/DL (ref 3.4–5)
ALP SERPL-CCNC: 61 U/L (ref 33–110)
ALT SERPL W P-5'-P-CCNC: 24 U/L (ref 7–45)
ANION GAP SERPL CALC-SCNC: 12 MMOL/L (ref 10–20)
AST SERPL W P-5'-P-CCNC: 20 U/L (ref 9–39)
BASOPHILS # BLD AUTO: 0.07 X10*3/UL (ref 0–0.1)
BASOPHILS NFR BLD AUTO: 0.8 %
BILIRUB SERPL-MCNC: 0.4 MG/DL (ref 0–1.2)
BUN SERPL-MCNC: 14 MG/DL (ref 6–23)
C3 SERPL-MCNC: 111 MG/DL (ref 87–200)
C4 SERPL-MCNC: 18 MG/DL (ref 10–50)
CALCIUM SERPL-MCNC: 10.2 MG/DL (ref 8.6–10.6)
CHLORIDE SERPL-SCNC: 103 MMOL/L (ref 98–107)
CO2 SERPL-SCNC: 28 MMOL/L (ref 21–32)
CREAT SERPL-MCNC: 0.9 MG/DL (ref 0.5–1.05)
DSDNA AB SER-ACNC: 19 IU/ML
EGFRCR SERPLBLD CKD-EPI 2021: 79 ML/MIN/1.73M*2
EOSINOPHIL # BLD AUTO: 0.13 X10*3/UL (ref 0–0.7)
EOSINOPHIL NFR BLD AUTO: 1.5 %
ERYTHROCYTE [DISTWIDTH] IN BLOOD BY AUTOMATED COUNT: 12.4 % (ref 11.5–14.5)
GLUCOSE SERPL-MCNC: 80 MG/DL (ref 74–99)
HCT VFR BLD AUTO: 44.5 % (ref 36–46)
HGB BLD-MCNC: 14.6 G/DL (ref 12–16)
IMM GRANULOCYTES # BLD AUTO: 0.02 X10*3/UL (ref 0–0.7)
IMM GRANULOCYTES NFR BLD AUTO: 0.2 % (ref 0–0.9)
LYMPHOCYTES # BLD AUTO: 2.38 X10*3/UL (ref 1.2–4.8)
LYMPHOCYTES NFR BLD AUTO: 27.8 %
MCH RBC QN AUTO: 30 PG (ref 26–34)
MCHC RBC AUTO-ENTMCNC: 32.8 G/DL (ref 32–36)
MCV RBC AUTO: 92 FL (ref 80–100)
MONOCYTES # BLD AUTO: 0.68 X10*3/UL (ref 0.1–1)
MONOCYTES NFR BLD AUTO: 7.9 %
NEUTROPHILS # BLD AUTO: 5.29 X10*3/UL (ref 1.2–7.7)
NEUTROPHILS NFR BLD AUTO: 61.8 %
NRBC BLD-RTO: 0 /100 WBCS (ref 0–0)
PLATELET # BLD AUTO: 288 X10*3/UL (ref 150–450)
POTASSIUM SERPL-SCNC: 4.4 MMOL/L (ref 3.5–5.3)
PROT SERPL-MCNC: 7.1 G/DL (ref 6.4–8.2)
RBC # BLD AUTO: 4.86 X10*6/UL (ref 4–5.2)
SODIUM SERPL-SCNC: 139 MMOL/L (ref 136–145)
TSH SERPL-ACNC: 1.85 MIU/L (ref 0.44–3.98)
WBC # BLD AUTO: 8.6 X10*3/UL (ref 4.4–11.3)

## 2024-12-04 PROCEDURE — 86160 COMPLEMENT ANTIGEN: CPT

## 2024-12-04 PROCEDURE — 36415 COLL VENOUS BLD VENIPUNCTURE: CPT

## 2024-12-04 PROCEDURE — 99214 OFFICE O/P EST MOD 30 MIN: CPT | Performed by: INTERNAL MEDICINE

## 2024-12-04 PROCEDURE — 84443 ASSAY THYROID STIM HORMONE: CPT

## 2024-12-04 PROCEDURE — 86225 DNA ANTIBODY NATIVE: CPT

## 2024-12-04 PROCEDURE — 80053 COMPREHEN METABOLIC PANEL: CPT

## 2024-12-04 PROCEDURE — 85025 COMPLETE CBC W/AUTO DIFF WBC: CPT

## 2024-12-04 NOTE — PROGRESS NOTES
Recheck  Arthralgia   Doing well     HPI - She has been doing well.  She had fatigue in Sept/Oct, but this has improved.  No pain.   No swelling.  No AM stiffness.  No rash, mouth sores, numbness/tingling, or raynauds.  +sicca - sometimes uses eye drops and chew gum    PE  NAD  Moist MM good salivary pooling (is chewing gum)  RRR no r/m/g  CTA  No edema  No synovitis  +heberdons  NT and no pain with ROM throughout    A/P - OA, UCTD - gen doing well.  Intermittent fatigue is most troublesome sx  Check yearly labs - pt also requests TSH recheck  Follow up 6 mo or sooner PRN

## 2024-12-16 ENCOUNTER — APPOINTMENT (OUTPATIENT)
Dept: GASTROENTEROLOGY | Facility: CLINIC | Age: 49
End: 2024-12-16
Payer: COMMERCIAL

## 2025-01-14 NOTE — PROGRESS NOTES
"Subjective   Chief Complaint   Patient presents with    Follow-up     Patient is here today to discuss medication for flying and the anxiety she has. Patient has been having ongoing abdominal pain for the past 4 months.        Patient ID: Shonna Lambert is a 49 y.o. female who presents for Follow-up (Patient is here today to discuss medication for flying and the anxiety she has. Patient has been having ongoing abdominal pain for the past 4 months. ).    HPI  Shonna is a 48 yo female presenting today for 2 reasons:    1. Requests anti-anxiety medication for an upcoming flight.  She states she has taken Ativan previously but it did not help her anxiety     2. intermittent RLQ \"pressure\" that is not present at the moment, but she wanted to mention it to me while she was here. Had hyster, but still has ovaries.  States this has been occurring on and off x 4 months   Denies N/V/D      Allergies   Allergen Reactions    Ciprofloxacin Angioedema, Swelling and Hives    Gluten Unknown    Quinolones Unknown    Soybean Unknown       Review of Systems  ROS was completed and all systems are negative with the exception of what was noted in the the HPI.       Objective   /85   Pulse 73   Ht 1.67 m (5' 5.75\")   Wt 72.1 kg (159 lb)   SpO2 97%   BMI 25.86 kg/m²      Current Outpatient Medications   Medication Instructions    cholecalciferol (VITAMIN D3) 5,000 Units, Daily    cyanocobalamin, vitamin B-12, (B-12 COMPLIANCE INJ) Every 30 days    cyanocobalamin, vitamin B-12, (Vitamin B-12) 1,000 mcg/mL drops 1 spray, Daily    diazePAM (VALIUM) 2 mg, oral, Daily PRN    docusate sodium (COLACE ORAL) Colace CAPS TAKE 1 CAPSULE Daily Quantity: 0 Refills: 0 Ordered: 7-Sep-2023 DO Active    hydroxychloroquine (Plaquenil) 200 mg tablet TAKE 1 TABLET BY MOUTH TWO TIMES A DAY    magnesium oxide (Mag-Ox) 400 mg (241.3 mg magnesium) tablet Magnesium Oxide -Mg Supplement 400 (240 Mg) MG Oral Tablet take 1 tablet by mouth once daily " Quantity: 0 Refills: 0 Ordered: 19-Sep-2023 Harmony Whaley MD Start : 19-Sep-2023 Active    saccharomyces boulardii (Florastor) 250 mg capsule 1 capsule, Daily    sodium,potassium,mag sulfates (Suprep) 17.5-3.13-1.6 gram recon soln solution Take one bottle twice as directed by the prep instructions    Trulance 3 mg, oral, Daily         Physical Exam  Vitals reviewed.   Abdominal:      General: Abdomen is flat. Bowel sounds are normal. There is no distension.      Palpations: There is no mass.      Tenderness: There is no abdominal tenderness. There is no guarding or rebound.      Hernia: No hernia is present.      Comments: No pain/TTP during exam   Neurological:      Mental Status: She is alert.         Assessment & Plan  Fear of flying  Try Valium 1 hour prior to flight  Orders:    diazePAM (Valium) 2 mg tablet; Take 1 tablet (2 mg) by mouth once daily as needed for anxiety (prior to flight) for up to 2 doses.    Undifferentiated connective tissue disease (Multi)  Condition stable based on symptoms and exam.    Continue current medications and follow-up at least yearly.  Followed by Rheum, Dr. Whaley

## 2025-01-15 ENCOUNTER — OFFICE VISIT (OUTPATIENT)
Dept: PRIMARY CARE | Facility: CLINIC | Age: 50
End: 2025-01-15
Payer: COMMERCIAL

## 2025-01-15 VITALS
DIASTOLIC BLOOD PRESSURE: 85 MMHG | SYSTOLIC BLOOD PRESSURE: 124 MMHG | HEIGHT: 66 IN | WEIGHT: 159 LBS | HEART RATE: 73 BPM | OXYGEN SATURATION: 97 % | BODY MASS INDEX: 25.55 KG/M2

## 2025-01-15 DIAGNOSIS — F40.243 FEAR OF FLYING: Primary | ICD-10-CM

## 2025-01-15 DIAGNOSIS — M35.9 UNDIFFERENTIATED CONNECTIVE TISSUE DISEASE (MULTI): ICD-10-CM

## 2025-01-15 PROCEDURE — 3008F BODY MASS INDEX DOCD: CPT | Performed by: NURSE PRACTITIONER

## 2025-01-15 PROCEDURE — 99213 OFFICE O/P EST LOW 20 MIN: CPT | Performed by: NURSE PRACTITIONER

## 2025-01-15 PROCEDURE — 1036F TOBACCO NON-USER: CPT | Performed by: NURSE PRACTITIONER

## 2025-01-15 PROCEDURE — RXMED WILLOW AMBULATORY MEDICATION CHARGE

## 2025-01-15 RX ORDER — DIAZEPAM 2 MG/1
2 TABLET ORAL DAILY PRN
Qty: 2 TABLET | Refills: 0 | Status: SHIPPED | OUTPATIENT
Start: 2025-01-15

## 2025-01-15 ASSESSMENT — PATIENT HEALTH QUESTIONNAIRE - PHQ9
1. LITTLE INTEREST OR PLEASURE IN DOING THINGS: NOT AT ALL
2. FEELING DOWN, DEPRESSED OR HOPELESS: NOT AT ALL
SUM OF ALL RESPONSES TO PHQ9 QUESTIONS 1 AND 2: 0

## 2025-01-15 NOTE — ASSESSMENT & PLAN NOTE
Condition stable based on symptoms and exam.    Continue current medications and follow-up at least yearly.  Followed by Rheum, Dr. Roter

## 2025-01-15 NOTE — ASSESSMENT & PLAN NOTE
Try Valium 1 hour prior to flight  Orders:    diazePAM (Valium) 2 mg tablet; Take 1 tablet (2 mg) by mouth once daily as needed for anxiety (prior to flight) for up to 2 doses.

## 2025-01-15 NOTE — PATIENT INSTRUCTIONS
Fear of flying  Try Valium 1 hour prior to flight  Orders:    diazePAM (Valium) 2 mg tablet; Take 1 tablet (2 mg) by mouth once daily as needed for anxiety (prior to flight) for up to 2 doses.    If your abdominal pain returns and persists, call GYN to evaluate for ovarian cyst

## 2025-01-16 ENCOUNTER — APPOINTMENT (OUTPATIENT)
Dept: PRIMARY CARE | Facility: CLINIC | Age: 50
End: 2025-01-16
Payer: COMMERCIAL

## 2025-01-22 ENCOUNTER — APPOINTMENT (OUTPATIENT)
Dept: GASTROENTEROLOGY | Facility: CLINIC | Age: 50
End: 2025-01-22
Payer: COMMERCIAL

## 2025-01-22 ENCOUNTER — LAB (OUTPATIENT)
Dept: LAB | Facility: LAB | Age: 50
End: 2025-01-22
Payer: COMMERCIAL

## 2025-01-22 ENCOUNTER — APPOINTMENT (OUTPATIENT)
Dept: RADIOLOGY | Facility: HOSPITAL | Age: 50
End: 2025-01-22
Payer: COMMERCIAL

## 2025-01-22 VITALS — BODY MASS INDEX: 24.1 KG/M2 | HEIGHT: 68 IN | WEIGHT: 159 LBS

## 2025-01-22 DIAGNOSIS — K59.04 CHRONIC IDIOPATHIC CONSTIPATION: ICD-10-CM

## 2025-01-22 DIAGNOSIS — R10.9 ABDOMINAL PAIN, UNSPECIFIED ABDOMINAL LOCATION: ICD-10-CM

## 2025-01-22 DIAGNOSIS — R11.0 MILD NAUSEA: ICD-10-CM

## 2025-01-22 DIAGNOSIS — K59.04 CHRONIC IDIOPATHIC CONSTIPATION: Primary | ICD-10-CM

## 2025-01-22 LAB
CREAT SERPL-MCNC: 0.82 MG/DL (ref 0.5–1.05)
EGFRCR SERPLBLD CKD-EPI 2021: 88 ML/MIN/1.73M*2

## 2025-01-22 PROCEDURE — 99214 OFFICE O/P EST MOD 30 MIN: CPT | Performed by: INTERNAL MEDICINE

## 2025-01-22 PROCEDURE — 3008F BODY MASS INDEX DOCD: CPT | Performed by: INTERNAL MEDICINE

## 2025-01-22 PROCEDURE — 83516 IMMUNOASSAY NONANTIBODY: CPT

## 2025-01-22 PROCEDURE — 82565 ASSAY OF CREATININE: CPT

## 2025-01-22 PROCEDURE — RXMED WILLOW AMBULATORY MEDICATION CHARGE

## 2025-01-22 RX ORDER — LUBIPROSTONE 24 UG/1
24 CAPSULE ORAL
Qty: 60 CAPSULE | Refills: 11 | Status: SHIPPED | OUTPATIENT
Start: 2025-01-22 | End: 2026-01-22

## 2025-01-22 RX ORDER — LUBIPROSTONE 24 UG/1
24 CAPSULE ORAL
Qty: 60 CAPSULE | Refills: 11 | Status: SHIPPED | OUTPATIENT
Start: 2025-01-22 | End: 2025-01-22 | Stop reason: SDUPTHER

## 2025-01-22 RX ORDER — ONDANSETRON 4 MG/1
4 TABLET, ORALLY DISINTEGRATING ORAL EVERY 8 HOURS PRN
Qty: 30 TABLET | Refills: 1 | Status: SHIPPED | OUTPATIENT
Start: 2025-01-22 | End: 2025-03-23

## 2025-01-22 NOTE — PROGRESS NOTES
Subjective     History of Present Illness:     48 yo with anxiety/depression, h/o constipation, celiac disease seen in follow up for abdominal bloating, discomfort, fatigue. Previously open access EGD/colonoscopy for elevated celiac serologies performed in jan 2022 with small bowel biopsies confirming celiac disease. Colonoscopy was normal to TI with internal hemorrhoids.     Last seen 3 mo ago, constipated, unable to get trulance due to cost was working well before. Previously treated for SIBO with augmentin after long course of doxycycline .  Constipation was not managed on mag ox, colace, miralax and prn suppository. Recommended clean out and reordered trulance which was approved .  Doing well until about a month ago then recurrence of constipation despite trulance .   No bowel movement except with suppository or  enema every other day.  Trulance this  month is 900 which is due to deductible will not get.  Some bloating returned  Has stopped plaquenil seeing new rheum soon.  Reports RLQ sharp pain, unable to palpate but severe when sitting up, brief      Previous workup:  Abd ultrasound negative. Stool studies wnl, Fcal neg, fecal fat neg.   KUB with stool buildup- fiber led to bloating , no change with miralax x 3 days. Hydrogen breath test negative forSIBO.   Celiac panel - TTG IGA 10, gliaden IgG 84 , DGP igA 37, TTG IGG 19- all improved from prior.    Allergies  Allergies   Allergen Reactions    Ciprofloxacin Angioedema, Swelling and Hives    Gluten Unknown    Quinolones Unknown    Soybean Unknown       Medications  Current Outpatient Medications   Medication Instructions    cholecalciferol (VITAMIN D3) 5,000 Units, Daily    cyanocobalamin, vitamin B-12, (B-12 COMPLIANCE INJ) Every 30 days    cyanocobalamin, vitamin B-12, (Vitamin B-12) 1,000 mcg/mL drops 1 spray, Daily    diazePAM (VALIUM) 2 mg, oral, Daily PRN    docusate sodium (COLACE ORAL) Colace CAPS TAKE 1 CAPSULE Daily Quantity: 0 Refills: 0 Ordered:  7-Sep-2023 DO Active    hydroxychloroquine (Plaquenil) 200 mg tablet TAKE 1 TABLET BY MOUTH TWO TIMES A DAY    magnesium oxide (Mag-Ox) 400 mg (241.3 mg magnesium) tablet Magnesium Oxide -Mg Supplement 400 (240 Mg) MG Oral Tablet take 1 tablet by mouth once daily Quantity: 0 Refills: 0 Ordered: 19-Sep-2023 Harmony Whaley MD Start : 19-Sep-2023 Active    saccharomyces boulardii (Florastor) 250 mg capsule 1 capsule, Daily    sodium,potassium,mag sulfates (Suprep) 17.5-3.13-1.6 gram recon soln solution Take one bottle twice as directed by the prep instructions    Trulance 3 mg, oral, Daily        Objective   There were no vitals taken for this visit.   Physical Exam  Cardiovascular:      Rate and Rhythm: Normal rate and regular rhythm.   Pulmonary:      Effort: Pulmonary effort is normal. No respiratory distress.      Breath sounds: Normal breath sounds. No wheezing.   Abdominal:      General: Bowel sounds are normal. There is no distension.      Palpations: Abdomen is soft. There is no mass.      Tenderness: There is no abdominal tenderness.   Neurological:      Mental Status: She is alert.               Assessment/Plan:      48 yo with anxiety/depression, h/o constipation, celiac disease seen in f/u for abdominal bloating and fatigue, chronic constipation. Recurrent constipation despite trulance, requiring suppository or enema, raising concern for defecatory dysfunction vs prolapse or rectocele. Will check ARM to start if negative MRI defogram.  Recommend CT a/p for abdominal pain.  Trial of stimulant laxative and given lubiprostone to try.        ARM ordered  Check CT a/p  Check celiac panel  Lubiprostone 24 mcg bid ordered  Renewed zofran, counselled to use sparingly     Brii Moran MD

## 2025-01-23 LAB
GLIADIN PEPTIDE IGA SER IA-ACNC: 21.3 U/ML
TTG IGA SER IA-ACNC: 5.9 U/ML

## 2025-01-24 ENCOUNTER — HOSPITAL ENCOUNTER (OUTPATIENT)
Dept: RADIOLOGY | Facility: HOSPITAL | Age: 50
Discharge: HOME | End: 2025-01-24
Payer: COMMERCIAL

## 2025-01-24 DIAGNOSIS — K59.04 CHRONIC IDIOPATHIC CONSTIPATION: ICD-10-CM

## 2025-01-24 DIAGNOSIS — R10.9 ABDOMINAL PAIN, UNSPECIFIED ABDOMINAL LOCATION: ICD-10-CM

## 2025-01-24 LAB
GLIADIN PEPTIDE IGG SER IA-ACNC: 32.52 FLU (ref 0–4.99)
TTG IGG SER IA-ACNC: 0.89 FLU (ref 0–4.99)

## 2025-01-24 PROCEDURE — 74177 CT ABD & PELVIS W/CONTRAST: CPT

## 2025-01-24 PROCEDURE — 2550000001 HC RX 255 CONTRASTS: Performed by: INTERNAL MEDICINE

## 2025-01-24 RX ADMIN — IOHEXOL 75 ML: 350 INJECTION, SOLUTION INTRAVENOUS at 08:28

## 2025-01-27 ENCOUNTER — PHARMACY VISIT (OUTPATIENT)
Dept: PHARMACY | Facility: CLINIC | Age: 50
End: 2025-01-27
Payer: COMMERCIAL

## 2025-02-25 LAB
ALBUMIN SERPL-MCNC: 4.4 G/DL (ref 3.6–5.1)
ALBUMIN/GLOB SERPL: 1.8 (CALC) (ref 1–2.5)
ALP SERPL-CCNC: 47 U/L (ref 31–125)
ALT SERPL-CCNC: 13 U/L (ref 6–29)
AST SERPL-CCNC: 14 U/L (ref 10–35)
BASOPHILS # BLD AUTO: 62 CELLS/UL (ref 0–200)
BASOPHILS NFR BLD AUTO: 0.8 %
BILIRUB SERPL-MCNC: 0.5 MG/DL (ref 0.2–1.2)
BUN SERPL-MCNC: 13 MG/DL (ref 7–25)
BUN/CREAT SERPL: NORMAL (CALC) (ref 6–22)
CALCIUM SERPL-MCNC: 10 MG/DL (ref 8.6–10.2)
CHLORIDE SERPL-SCNC: 105 MMOL/L (ref 98–110)
CK SERPL-CCNC: 33 U/L (ref 20–239)
CO2 SERPL-SCNC: 30 MMOL/L (ref 20–32)
CREAT SERPL-MCNC: 0.78 MG/DL (ref 0.5–0.99)
CRP SERPL-MCNC: <3 MG/L
EGFRCR SERPLBLD CKD-EPI 2021: 94 ML/MIN/1.73M2
EOSINOPHIL # BLD AUTO: 62 CELLS/UL (ref 15–500)
EOSINOPHIL NFR BLD AUTO: 0.8 %
ERYTHROCYTE [DISTWIDTH] IN BLOOD BY AUTOMATED COUNT: 12 % (ref 11–15)
ERYTHROCYTE [SEDIMENTATION RATE] IN BLOOD BY WESTERGREN METHOD: 2 MM/H
GAMMA INTERFERON BACKGROUND BLD IA-ACNC: 0.04 IU/ML
GLOBULIN SER CALC-MCNC: 2.4 G/DL (CALC) (ref 1.9–3.7)
GLUCOSE SERPL-MCNC: 90 MG/DL (ref 65–139)
HAV AB SER QL IA: NORMAL
HBV CORE AB SERPL QL IA: NORMAL
HBV SURFACE AB SER QL IA: NORMAL
HBV SURFACE AG SERPL QL IA: NORMAL
HCT VFR BLD AUTO: 43.6 % (ref 35–45)
HCV AB SERPL QL IA: NORMAL
HGB BLD-MCNC: 14.2 G/DL (ref 11.7–15.5)
HISTAMINE SERPL-MCNC: <1.5 NG/ML
HLA-B27 QL FC: NEGATIVE
IGA SERPL-MCNC: 114 MG/DL (ref 47–310)
IGE SERPL-ACNC: 5 KU/L
IGG SERPL-MCNC: 1047 MG/DL (ref 600–1640)
IGG SERPL-MCNC: 1198 MG/DL (ref 600–1640)
IGG1 SER-MCNC: 562 MG/DL (ref 382–929)
IGG2 SER-MCNC: 406 MG/DL (ref 241–700)
IGG3 SER-MCNC: 72 MG/DL (ref 22–178)
IGG4 SER-MCNC: 32.1 MG/DL (ref 4–86)
IGM SERPL-MCNC: 174 MG/DL (ref 50–300)
LYMPHOCYTES # BLD AUTO: 2056 CELLS/UL (ref 850–3900)
LYMPHOCYTES NFR BLD AUTO: 26.7 %
M TB IFN-G BLD-IMP: NEGATIVE
M TB IFN-G CD4+ BCKGRND COR BLD-ACNC: <0 IU/ML
M TB IFN-G CD4+CD8+ BCKGRND COR BLD-ACNC: <0 IU/ML
MCH RBC QN AUTO: 29.3 PG (ref 27–33)
MCHC RBC AUTO-ENTMCNC: 32.6 G/DL (ref 32–36)
MCV RBC AUTO: 90.1 FL (ref 80–100)
MITOGEN IGNF BCKGRD COR BLD-ACNC: 7.75 IU/ML
MONOCYTES # BLD AUTO: 585 CELLS/UL (ref 200–950)
MONOCYTES NFR BLD AUTO: 7.6 %
NEUTROPHILS # BLD AUTO: 4936 CELLS/UL (ref 1500–7800)
NEUTROPHILS NFR BLD AUTO: 64.1 %
PLATELET # BLD AUTO: 306 THOUSAND/UL (ref 140–400)
PMV BLD REES-ECKER: 10.8 FL (ref 7.5–12.5)
POTASSIUM SERPL-SCNC: 4.5 MMOL/L (ref 3.5–5.3)
PROT SERPL-MCNC: 6.8 G/DL (ref 6.1–8.1)
RBC # BLD AUTO: 4.84 MILLION/UL (ref 3.8–5.1)
SODIUM SERPL-SCNC: 139 MMOL/L (ref 135–146)
T4 FREE SERPL-MCNC: 1.2 NG/DL (ref 0.8–1.8)
TRYPTASE SERPL-MCNC: 4.8 MCG/L
TSH SERPL-ACNC: 1.55 MIU/L
URATE SERPL-MCNC: 5.3 MG/DL (ref 2.5–7)
WBC # BLD AUTO: 7.7 THOUSAND/UL (ref 3.8–10.8)

## 2025-03-18 ENCOUNTER — OFFICE VISIT (OUTPATIENT)
Dept: PRIMARY CARE | Facility: CLINIC | Age: 50
End: 2025-03-18
Payer: COMMERCIAL

## 2025-03-18 VITALS
BODY MASS INDEX: 23.04 KG/M2 | OXYGEN SATURATION: 98 % | HEIGHT: 68 IN | HEART RATE: 70 BPM | DIASTOLIC BLOOD PRESSURE: 82 MMHG | WEIGHT: 152 LBS | SYSTOLIC BLOOD PRESSURE: 127 MMHG

## 2025-03-18 DIAGNOSIS — R53.83 TIREDNESS: Primary | ICD-10-CM

## 2025-03-18 PROCEDURE — 99212 OFFICE O/P EST SF 10 MIN: CPT | Performed by: NURSE PRACTITIONER

## 2025-03-18 PROCEDURE — 1036F TOBACCO NON-USER: CPT | Performed by: NURSE PRACTITIONER

## 2025-03-18 PROCEDURE — 3008F BODY MASS INDEX DOCD: CPT | Performed by: NURSE PRACTITIONER

## 2025-03-18 NOTE — PROGRESS NOTES
"Subjective   Chief Complaint   Patient presents with    Follow-up     Rhumetologist not in  requesting lab orders       Patient ID: Shonna Lambert is a 49 y.o. female who presents for Follow-up (Rhumetologist not in  requesting lab orders).    HPI  Shonna presents today requesting tests that Rheum recommended     Pt est care with akira Holliday, Dr. Larsen in Feb   Had mult lab tests---> had f/u in March, last week   ? Spondylitis  ? Sjrogrens Disease     Pt has not had sleep study, r/o narcolepsy  Will order     Pt has weaned self off plaquenil and did not like the Amitiza so she is not taking that either     She is frustrated with not feeling well for years        Allergies   Allergen Reactions    Ciprofloxacin Angioedema, Swelling and Hives    Gluten Unknown    Quinolones Unknown    Soybean Unknown       Review of Systems  ROS was completed and all systems are negative with the exception of what was noted in the the HPI.       Objective   /82   Pulse 70   Ht 1.727 m (5' 8\")   Wt 68.9 kg (152 lb)   SpO2 98%   BMI 23.11 kg/m²      Current Outpatient Medications   Medication Instructions    cholecalciferol (VITAMIN D3) 5,000 Units, Daily    cyanocobalamin, vitamin B-12, (B-12 COMPLIANCE INJ) Every 30 days    cyanocobalamin, vitamin B-12, (Vitamin B-12) 1,000 mcg/mL drops 1 spray, Daily    diazePAM (VALIUM) 2 mg, oral, Daily PRN    ondansetron ODT (ZOFRAN-ODT) 4 mg, oral, Every 8 hours PRN    sodium,potassium,mag sulfates (Suprep) 17.5-3.13-1.6 gram recon soln solution Take one bottle twice as directed by the prep instructions       Past Surgical History:   Procedure Laterality Date     SECTION, CLASSIC  2015     Section    HYSTERECTOMY  2018    Hysterectomy    TUBAL LIGATION  2017    Tubal Ligation         Physical Exam  Vitals reviewed.   Pulmonary:      Effort: Pulmonary effort is normal.   Neurological:      Mental Status: She is alert and oriented to person, place, and " time.         Assessment & Plan  Tiredness  Schedule sleep study   Orders:    In-Center Sleep Study; Future

## 2025-03-18 NOTE — PATIENT INSTRUCTIONS
Thank you for seeing me today Shonna Lambert, it was a pleasure to see you again!    Schedule sleep study    Follow up with Dr. Larsen as scheduled     For assistance with scheduling referrals or consultations, please call [phone] or [phone].    For laboratory, radiology, and other tests, please call [phone] ([phone] for pediatrics).   For laboratory locations, please visit [email or web address]   If you do not get results within 7-10 days, or you have any questions or concerns, please send a message, call the office ([phone]), or return to the office for a follow-up appointment.     For acute/sick visits, if you are unable to get an office visit, you can do a  On Demand Virtual Visit that is accessible via your My Chart account.  For emergencies, call 9-1-1 or go to the nearest Emergency Department.     Please schedule additional appointment(s) to address concern(s) not addressed today.    Please review prescription inserts and published information for possible adverse effects of all medications.     In general, results are discussed over the phone or via  [employer].     You can see your health information, review clinical summaries from office visits & test results online when you follow your health with MY  Chart, a personal health record.   To sign up go to [email or web address].   If you need assistance with signing up or trouble getting into your account call [employer] Patient Line 24/7 at [phone]     RTC FOR CPE     ~Christina Olivia NP

## 2025-03-19 DIAGNOSIS — R53.83 TIREDNESS: Primary | ICD-10-CM

## 2025-03-19 DIAGNOSIS — R53.82 CHRONIC FATIGUE: ICD-10-CM

## 2025-03-20 ENCOUNTER — APPOINTMENT (OUTPATIENT)
Dept: PRIMARY CARE | Facility: CLINIC | Age: 50
End: 2025-03-20
Payer: COMMERCIAL

## 2025-04-02 ENCOUNTER — APPOINTMENT (OUTPATIENT)
Dept: PRIMARY CARE | Facility: CLINIC | Age: 50
End: 2025-04-02
Payer: COMMERCIAL

## 2025-04-02 NOTE — PROGRESS NOTES
Shonna Lambert is a 49 y.o. female who presents today for an acute sick visit    No chief complaint on file.      Symptoms: Neck pain     Symptom onset has been {gen onset/course:738517} for a time period of *** {gen duration:309126}.     Severity is described as {gen severity:813417}.     Course of her symptoms over time is {gen onset/course:649437}.    Has taken ***     Allergies  Allergies   Allergen Reactions    Ciprofloxacin Angioedema, Swelling and Hives    Gluten Unknown    Quinolones Unknown    Soybean Unknown       Review of Systems  ROS was completed and all systems are negative with the exception of what was noted in the the HPI.       Objective     Most Recent Vitals  There were no vitals taken for this visit.      Current Medications  Current Outpatient Medications   Medication Instructions    cholecalciferol (VITAMIN D3) 5,000 Units, Daily    cyanocobalamin, vitamin B-12, (B-12 COMPLIANCE INJ) Every 30 days    cyanocobalamin, vitamin B-12, (Vitamin B-12) 1,000 mcg/mL drops 1 spray, Daily    diazePAM (VALIUM) 2 mg, oral, Daily PRN    sodium,potassium,mag sulfates (Suprep) 17.5-3.13-1.6 gram recon soln solution Take one bottle twice as directed by the prep instructions         Physical Exam      Assessment & Plan

## 2025-04-09 ENCOUNTER — PATIENT MESSAGE (OUTPATIENT)
Dept: CARE COORDINATION | Facility: CLINIC | Age: 50
End: 2025-04-09
Payer: COMMERCIAL

## 2025-04-11 ENCOUNTER — HOSPITAL ENCOUNTER (OUTPATIENT)
Dept: RADIOLOGY | Facility: HOSPITAL | Age: 50
Discharge: HOME | End: 2025-04-11
Payer: COMMERCIAL

## 2025-04-11 VITALS — HEIGHT: 68 IN | BODY MASS INDEX: 23.04 KG/M2 | WEIGHT: 152 LBS

## 2025-04-11 DIAGNOSIS — Z12.31 SCREENING MAMMOGRAM FOR BREAST CANCER: ICD-10-CM

## 2025-04-11 PROCEDURE — 77067 SCR MAMMO BI INCL CAD: CPT | Performed by: STUDENT IN AN ORGANIZED HEALTH CARE EDUCATION/TRAINING PROGRAM

## 2025-04-11 PROCEDURE — 77063 BREAST TOMOSYNTHESIS BI: CPT | Performed by: STUDENT IN AN ORGANIZED HEALTH CARE EDUCATION/TRAINING PROGRAM

## 2025-04-11 PROCEDURE — 77063 BREAST TOMOSYNTHESIS BI: CPT

## 2025-04-13 ENCOUNTER — HOSPITAL ENCOUNTER (EMERGENCY)
Facility: HOSPITAL | Age: 50
Discharge: HOME | End: 2025-04-13
Attending: STUDENT IN AN ORGANIZED HEALTH CARE EDUCATION/TRAINING PROGRAM
Payer: COMMERCIAL

## 2025-04-13 VITALS
HEIGHT: 66 IN | RESPIRATION RATE: 18 BRPM | OXYGEN SATURATION: 99 % | WEIGHT: 149 LBS | BODY MASS INDEX: 23.95 KG/M2 | SYSTOLIC BLOOD PRESSURE: 119 MMHG | DIASTOLIC BLOOD PRESSURE: 74 MMHG | TEMPERATURE: 97.9 F | HEART RATE: 85 BPM

## 2025-04-13 DIAGNOSIS — R11.2 NAUSEA AND VOMITING, UNSPECIFIED VOMITING TYPE: Primary | ICD-10-CM

## 2025-04-13 LAB
ALBUMIN SERPL BCP-MCNC: 4 G/DL (ref 3.4–5)
ALP SERPL-CCNC: 51 U/L (ref 33–110)
ALT SERPL W P-5'-P-CCNC: 17 U/L (ref 7–45)
ANION GAP SERPL CALC-SCNC: 14 MMOL/L (ref 10–20)
AST SERPL W P-5'-P-CCNC: 15 U/L (ref 9–39)
BASOPHILS # BLD AUTO: 0.04 X10*3/UL (ref 0–0.1)
BASOPHILS NFR BLD AUTO: 0.4 %
BILIRUB SERPL-MCNC: 0.6 MG/DL (ref 0–1.2)
BUN SERPL-MCNC: 13 MG/DL (ref 6–23)
CALCIUM SERPL-MCNC: 9.7 MG/DL (ref 8.6–10.3)
CHLORIDE SERPL-SCNC: 103 MMOL/L (ref 98–107)
CO2 SERPL-SCNC: 26 MMOL/L (ref 21–32)
CREAT SERPL-MCNC: 0.8 MG/DL (ref 0.5–1.05)
EGFRCR SERPLBLD CKD-EPI 2021: >90 ML/MIN/1.73M*2
EOSINOPHIL # BLD AUTO: 0.03 X10*3/UL (ref 0–0.7)
EOSINOPHIL NFR BLD AUTO: 0.3 %
ERYTHROCYTE [DISTWIDTH] IN BLOOD BY AUTOMATED COUNT: 12.9 % (ref 11.5–14.5)
GLUCOSE SERPL-MCNC: 91 MG/DL (ref 74–99)
HCT VFR BLD AUTO: 41.7 % (ref 36–46)
HGB BLD-MCNC: 14.4 G/DL (ref 12–16)
IMM GRANULOCYTES # BLD AUTO: 0.02 X10*3/UL (ref 0–0.7)
IMM GRANULOCYTES NFR BLD AUTO: 0.2 % (ref 0–0.9)
LIPASE SERPL-CCNC: 15 U/L (ref 9–82)
LYMPHOCYTES # BLD AUTO: 1.38 X10*3/UL (ref 1.2–4.8)
LYMPHOCYTES NFR BLD AUTO: 15 %
MCH RBC QN AUTO: 30.8 PG (ref 26–34)
MCHC RBC AUTO-ENTMCNC: 34.5 G/DL (ref 32–36)
MCV RBC AUTO: 89 FL (ref 80–100)
MONOCYTES # BLD AUTO: 0.55 X10*3/UL (ref 0.1–1)
MONOCYTES NFR BLD AUTO: 6 %
NEUTROPHILS # BLD AUTO: 7.16 X10*3/UL (ref 1.2–7.7)
NEUTROPHILS NFR BLD AUTO: 78.1 %
NRBC BLD-RTO: 0 /100 WBCS (ref 0–0)
PLATELET # BLD AUTO: 215 X10*3/UL (ref 150–450)
POTASSIUM SERPL-SCNC: 3.5 MMOL/L (ref 3.5–5.3)
PROT SERPL-MCNC: 6.7 G/DL (ref 6.4–8.2)
RBC # BLD AUTO: 4.67 X10*6/UL (ref 4–5.2)
SODIUM SERPL-SCNC: 139 MMOL/L (ref 136–145)
WBC # BLD AUTO: 9.2 X10*3/UL (ref 4.4–11.3)

## 2025-04-13 PROCEDURE — 96375 TX/PRO/DX INJ NEW DRUG ADDON: CPT

## 2025-04-13 PROCEDURE — 99284 EMERGENCY DEPT VISIT MOD MDM: CPT | Performed by: STUDENT IN AN ORGANIZED HEALTH CARE EDUCATION/TRAINING PROGRAM

## 2025-04-13 PROCEDURE — 85025 COMPLETE CBC W/AUTO DIFF WBC: CPT | Performed by: STUDENT IN AN ORGANIZED HEALTH CARE EDUCATION/TRAINING PROGRAM

## 2025-04-13 PROCEDURE — 80053 COMPREHEN METABOLIC PANEL: CPT | Performed by: STUDENT IN AN ORGANIZED HEALTH CARE EDUCATION/TRAINING PROGRAM

## 2025-04-13 PROCEDURE — 2500000004 HC RX 250 GENERAL PHARMACY W/ HCPCS (ALT 636 FOR OP/ED): Performed by: STUDENT IN AN ORGANIZED HEALTH CARE EDUCATION/TRAINING PROGRAM

## 2025-04-13 PROCEDURE — 2500000004 HC RX 250 GENERAL PHARMACY W/ HCPCS (ALT 636 FOR OP/ED)

## 2025-04-13 PROCEDURE — 96361 HYDRATE IV INFUSION ADD-ON: CPT

## 2025-04-13 PROCEDURE — 83690 ASSAY OF LIPASE: CPT | Performed by: STUDENT IN AN ORGANIZED HEALTH CARE EDUCATION/TRAINING PROGRAM

## 2025-04-13 PROCEDURE — 96374 THER/PROPH/DIAG INJ IV PUSH: CPT

## 2025-04-13 PROCEDURE — 36415 COLL VENOUS BLD VENIPUNCTURE: CPT | Performed by: STUDENT IN AN ORGANIZED HEALTH CARE EDUCATION/TRAINING PROGRAM

## 2025-04-13 RX ORDER — ONDANSETRON HYDROCHLORIDE 2 MG/ML
4 INJECTION, SOLUTION INTRAVENOUS ONCE
Status: COMPLETED | OUTPATIENT
Start: 2025-04-13 | End: 2025-04-13

## 2025-04-13 RX ORDER — PROCHLORPERAZINE EDISYLATE 5 MG/ML
5 INJECTION INTRAMUSCULAR; INTRAVENOUS ONCE
Status: COMPLETED | OUTPATIENT
Start: 2025-04-13 | End: 2025-04-13

## 2025-04-13 RX ORDER — ONDANSETRON HYDROCHLORIDE 2 MG/ML
INJECTION, SOLUTION INTRAVENOUS
Status: COMPLETED
Start: 2025-04-13 | End: 2025-04-13

## 2025-04-13 RX ADMIN — SODIUM CHLORIDE, SODIUM LACTATE, POTASSIUM CHLORIDE, AND CALCIUM CHLORIDE 1000 ML: .6; .31; .03; .02 INJECTION, SOLUTION INTRAVENOUS at 19:40

## 2025-04-13 RX ADMIN — PROCHLORPERAZINE EDISYLATE 5 MG: 5 INJECTION INTRAMUSCULAR; INTRAVENOUS at 21:04

## 2025-04-13 RX ADMIN — ONDANSETRON HYDROCHLORIDE 4 MG: 2 INJECTION, SOLUTION INTRAVENOUS at 19:47

## 2025-04-13 RX ADMIN — ONDANSETRON 4 MG: 2 INJECTION, SOLUTION INTRAMUSCULAR; INTRAVENOUS at 19:47

## 2025-04-13 ASSESSMENT — LIFESTYLE VARIABLES
HAVE YOU EVER FELT YOU SHOULD CUT DOWN ON YOUR DRINKING: NO
EVER FELT BAD OR GUILTY ABOUT YOUR DRINKING: NO
EVER HAD A DRINK FIRST THING IN THE MORNING TO STEADY YOUR NERVES TO GET RID OF A HANGOVER: NO
HAVE PEOPLE ANNOYED YOU BY CRITICIZING YOUR DRINKING: NO
TOTAL SCORE: 0

## 2025-04-13 ASSESSMENT — COLUMBIA-SUICIDE SEVERITY RATING SCALE - C-SSRS
1. IN THE PAST MONTH, HAVE YOU WISHED YOU WERE DEAD OR WISHED YOU COULD GO TO SLEEP AND NOT WAKE UP?: NO
2. HAVE YOU ACTUALLY HAD ANY THOUGHTS OF KILLING YOURSELF?: NO
6. HAVE YOU EVER DONE ANYTHING, STARTED TO DO ANYTHING, OR PREPARED TO DO ANYTHING TO END YOUR LIFE?: NO

## 2025-04-13 ASSESSMENT — PAIN DESCRIPTION - LOCATION: LOCATION: NECK

## 2025-04-13 ASSESSMENT — PAIN - FUNCTIONAL ASSESSMENT: PAIN_FUNCTIONAL_ASSESSMENT: 0-10

## 2025-04-13 ASSESSMENT — PAIN SCALES - GENERAL: PAINLEVEL_OUTOF10: 8

## 2025-04-13 NOTE — ED TRIAGE NOTES
Patient here for vomiting Has celiac disease believes she ate gluten last night due to not being able to stop vomiting. Cannot keep water down. Last urination was this AM. Endorses pain in her neck and jaw

## 2025-04-22 NOTE — ED PROVIDER NOTES
Chief Complaint: Nausea and vomiting  HPI: This is a 49-year-old female, presenting history significant for celiac's disease, presenting to the emergency department for evaluation of nausea and vomiting which has been persistent for the last 2 nights.  Patient states that she may have come in contact with gluten during a meal, and she has not been able to keep anything down since.  She denies any chest pain, however does complain of some abdominal cramping.  Patient has had symptoms similar to this with exposure to gluten.    Medical History[1]   Surgical History[2]    Physical Exam  Vitals and nursing note reviewed.   Constitutional:       Appearance: Normal appearance.   HENT:      Head: Normocephalic and atraumatic.      Mouth/Throat:      Mouth: Mucous membranes are moist.   Eyes:      Conjunctiva/sclera: Conjunctivae normal.   Cardiovascular:      Rate and Rhythm: Normal rate.   Pulmonary:      Effort: Pulmonary effort is normal.   Abdominal:      General: Abdomen is flat.      Palpations: Abdomen is soft.      Tenderness: There is no abdominal tenderness.   Musculoskeletal:         General: Normal range of motion.      Cervical back: Normal range of motion.   Skin:     General: Skin is warm and dry.   Neurological:      General: No focal deficit present.      Mental Status: She is alert.   Psychiatric:         Mood and Affect: Mood normal.            ED Course/MDM  Diagnoses as of 04/22/25 1410   Nausea and vomiting, unspecified vomiting type     This is a 49 y.o. female presenting to the ED for evaluation of nausea and vomiting which began 2 nights ago.  Patient believes she was exposed to gluten, which she has started this to her in the past.  On physical exam, the patient does appear uncomfortable, however is nontoxic-appearing.  Vitals are stable.  Abdomen is soft and nontender.  Lab work is overall grossly unremarkable.  Patient was given IV fluids as well as Zofran.  Patient states that Zofran did not  help, and so she was given Compazine which did improve her symptoms.  Patient was able to tolerate some p.o. in the emergency department, and does feel comfortable with discharge home at this time.  She has Zofran at home.  She was given return precautions and was discharged home in stable condition.    Final Impression  1.  Nausea and vomiting   Disposition/Plan: discharged home  Condition at disposition: Stable.     Vicky Gusman DO  Emergency Medicine Physician       [1]   Past Medical History:  Diagnosis Date    Atypical squamous cells cannot exclude high grade squamous intraepithelial lesion on cytologic smear of cervix (ASC-H) 2015    Pap smear of cervix with ASCUS, cannot exclude HGSIL    Bradycardia, unspecified 2015    Bradycardia    Breast cyst     Encounter for full-term uncomplicated delivery      (spontaneous vaginal delivery)    Personal history of diseases of the blood and blood-forming organs and certain disorders involving the immune mechanism     History of anemia    Personal history of other complications of pregnancy, childbirth and the puerperium     History of miscarriage    Personal history of other diseases of the respiratory system 2020    History of influenza    Personal history of other diseases of the respiratory system 2020    History of influenza    Personal history of other diseases of the respiratory system     History of sore throat    Personal history of other diseases of the respiratory system 2017    History of sinusitis    Personal history of other infectious and parasitic diseases     History of varicella    Personal history of other infectious and parasitic diseases 2022    History of candidiasis of vagina    Personal history of other mental and behavioral disorders     History of depression    Personal history of other specified conditions     History of headache    Personal history of other specified conditions 2015     History of syncope     , unspecified weeks of gestation ([facility]) 2015    Premature birth    Retained placenta without hemorrhage ([facility]) 2015    Retained placenta    Twin pregnancy, unspecified number of placenta and unspecified number of amniotic sacs, unspecified trimester ([facility]) 2015    Twin pregnancy    Unspecified abnormal cytological findings in specimens from cervix uteri 2017    Atypical glandular cells of undetermined significance (RG) on cervical Pap smear    Unspecified symptoms and signs involving the genitourinary system 2018    UTI symptoms    Urinary tract infection, site not specified 2016    Frequent UTI   [2]   Past Surgical History:  Procedure Laterality Date     SECTION, CLASSIC  2015     Section    HYSTERECTOMY  2018    Hysterectomy    TUBAL LIGATION  2017    Tubal Ligation        Vicky Gusman DO  [phone]

## 2025-06-03 ENCOUNTER — APPOINTMENT (OUTPATIENT)
Dept: RHEUMATOLOGY | Facility: CLINIC | Age: 50
End: 2025-06-03
Payer: COMMERCIAL

## 2025-06-30 ENCOUNTER — APPOINTMENT (OUTPATIENT)
Dept: DERMATOLOGY | Facility: CLINIC | Age: 50
End: 2025-06-30
Payer: COMMERCIAL

## 2025-06-30 DIAGNOSIS — I78.1 TELANGIECTASIAS: Primary | ICD-10-CM

## 2025-06-30 DIAGNOSIS — L82.1 SEBORRHEIC KERATOSIS: ICD-10-CM

## 2025-06-30 DIAGNOSIS — L57.9 SKIN CHANGES DUE TO CHRONIC EXPOSURE TO NONIONIZING RADIATION: ICD-10-CM

## 2025-06-30 PROCEDURE — 1036F TOBACCO NON-USER: CPT | Performed by: NURSE PRACTITIONER

## 2025-06-30 PROCEDURE — 99213 OFFICE O/P EST LOW 20 MIN: CPT | Performed by: NURSE PRACTITIONER

## 2025-06-30 NOTE — PATIENT INSTRUCTIONS

## 2025-06-30 NOTE — PROGRESS NOTES
Subjective     Shonna Lambert is a 49 y.o. female who presents for the following: Suspicious Skin Lesion (Left arm).     Review of Systems:  No other skin or systemic complaints other than what is documented elsewhere in the note.    The following portions of the chart were reviewed this encounter and updated as appropriate:   Tobacco  Allergies  Meds  Problems  Med Hx  Surg Hx         Skin Cancer History  Biopsy Log Book  No skin cancers from Specimen Tracking.    Additional History      Specialty Problems          Dermatology Problems    Melanocytic nevi of unspecified upper limb, including shoulder    Acne vulgaris    Basal cell carcinoma of skin of right upper limb, including shoulder    Melanocytic nevi of trunk    Hemangioma of skin and subcutaneous tissue    Melanocytic nevi of scalp and neck    Melanocytic nevi of unspecified lower limb, including hip    Other hypertrophic disorders of the skin    Other melanin hyperpigmentation    Acne    Atypical pigmented skin lesion    Dermatitis    Rash        Objective   Well appearing patient in no apparent distress; mood and affect are within normal limits.    A focused skin examination was performed. All findings within normal limits unless otherwise noted below.    Assessment/Plan   Skin Exam  1. TELANGIECTASIAS  Left Forearm - Posterior  4 x 5 mm red blanchable macules with superficial blanching vessels  Lesion present for 2 years. Denies area is itchy. Skin can itch when in the sun, hx of MCTD. Denies changing shape, size or color.     Telangiectasias are widely open (dilated) blood vessels in the outer layer of the skin. When seen on the legs, they are often called spider veins.    If the lesion is cosmetically bothersome, it can usually be removed with burning (electrodesiccation) or laser treatments.    PLAN    Use cover-up makeup or self-tanning lotion to cover them up. Waterproof leg makeup is also available.  If in a sun-exposed area, use  sun-protection measures, such as a hat or sunscreen.  The present appearance of the lesion is not worrisome but it should continue to be observed and testing/treatment may be warranted if change occurs.    Reassured patient no vascular patterns suspicious for BCC on dermoscopy today. Advised to monitor for now.     2. SEBORRHEIC KERATOSIS  Left Upper Arm - Anterior  Stuck on verrucous, tan-brown papules and plaques.    Seborrheic keratoses are common noncancerous (benign) growths of unknown cause seen in adults due to a thickening of an area of the top skin layer. Seborrheic keratoses may appear as if they are stuck on to the skin. They have distinct borders, and they may appear as papules (small, solid bumps) or plaques (solid, raised patches that are bigger than a thumbnail). They may be the same color as your skin, or they may be pink, light brown, darker brown, or very dark brown, or sometimes may appear black.    There is no way to prevent new seborrheic keratoses from forming. Seborrheic keratoses can be removed, but removal is considered a cosmetic issue and is usually not covered by insurance.    PLAN  No treatment is needed unless there is irritation from clothing, such as itching or bleeding.  2.   Some lotions containing alpha hydroxy acids, salicylic acid, or urea may make the areas feel smoother with regular use but will not eliminate them.  3. SKIN CHANGES DUE TO CHRONIC EXPOSURE TO NONIONIZING RADIATION  Generalized  Actinic changes in the form of freckles, lentigines and hyper/hypopigmentation   ABCDEs of melanoma and atypical moles were discussed with the patient.    Patient was instructed to perform monthly self skin examination.  We recommended that the patient have regular full skin exams given an increased risk of subsequent skin cancers.    The patient was instructed to use sun protective behaviors including use of broad spectrum sunscreens and sun protective clothing to reduce risk of skin  cancers.    Warning signs of non-melanoma skin cancer discussed.    Return to clinic in 1 year for skin check/follow up or sooner if needed

## 2025-06-30 NOTE — Clinical Note
Lesion present for 2 years. Denies area is itchy. Skin can itch when in the sun, hx of MCTD. Denies changing shape, size or color.     Telangiectasias are widely open (dilated) blood vessels in the outer layer of the skin. When seen on the legs, they are often called spider veins.    If the lesion is cosmetically bothersome, it can usually be removed with burning (electrodesiccation) or laser treatments.    PLAN    Use cover-up makeup or self-tanning lotion to cover them up. Waterproof leg makeup is also available.  If in a sun-exposed area, use sun-protection measures, such as a hat or sunscreen.  The present appearance of the lesion is not worrisome but it should continue to be observed and testing/treatment may be warranted if change occurs.    Reassured patient no vascular patterns suspicious for BCC on dermoscopy today. Advised to monitor for now.

## 2025-08-21 ENCOUNTER — PHARMACY VISIT (OUTPATIENT)
Dept: PHARMACY | Facility: CLINIC | Age: 50
End: 2025-08-21
Payer: COMMERCIAL

## 2025-08-21 PROCEDURE — RXMED WILLOW AMBULATORY MEDICATION CHARGE

## 2025-09-02 ENCOUNTER — APPOINTMENT (OUTPATIENT)
Dept: PRIMARY CARE | Facility: CLINIC | Age: 50
End: 2025-09-02
Payer: COMMERCIAL

## 2026-07-01 ENCOUNTER — APPOINTMENT (OUTPATIENT)
Dept: DERMATOLOGY | Facility: CLINIC | Age: 51
End: 2026-07-01
Payer: COMMERCIAL